# Patient Record
Sex: MALE | Race: WHITE | NOT HISPANIC OR LATINO | Employment: FULL TIME | URBAN - METROPOLITAN AREA
[De-identification: names, ages, dates, MRNs, and addresses within clinical notes are randomized per-mention and may not be internally consistent; named-entity substitution may affect disease eponyms.]

---

## 2017-09-18 ENCOUNTER — OFFICE VISIT (OUTPATIENT)
Dept: FAMILY MEDICINE CLINIC | Facility: CLINIC | Age: 47
End: 2017-09-18

## 2017-09-18 VITALS
BODY MASS INDEX: 25.28 KG/M2 | SYSTOLIC BLOOD PRESSURE: 116 MMHG | TEMPERATURE: 98.4 F | RESPIRATION RATE: 14 BRPM | OXYGEN SATURATION: 98 % | HEIGHT: 70 IN | DIASTOLIC BLOOD PRESSURE: 84 MMHG | WEIGHT: 176.6 LBS | HEART RATE: 56 BPM

## 2017-09-18 DIAGNOSIS — Z12.11 COLON CANCER SCREENING: ICD-10-CM

## 2017-09-18 DIAGNOSIS — Z00.00 ROUTINE GENERAL MEDICAL EXAMINATION AT A HEALTH CARE FACILITY: Primary | ICD-10-CM

## 2017-09-18 DIAGNOSIS — Z12.5 SPECIAL SCREENING FOR MALIGNANT NEOPLASM OF PROSTATE: ICD-10-CM

## 2017-09-18 PROCEDURE — 99386 PREV VISIT NEW AGE 40-64: CPT | Performed by: PHYSICIAN ASSISTANT

## 2017-09-18 RX ORDER — SIMVASTATIN 20 MG
20 TABLET ORAL NIGHTLY
COMMUNITY
End: 2017-11-27 | Stop reason: DRUGHIGH

## 2017-09-18 RX ORDER — FINASTERIDE 5 MG/1
5 TABLET, FILM COATED ORAL DAILY
COMMUNITY
End: 2018-07-03 | Stop reason: SDUPTHER

## 2017-09-18 NOTE — PROGRESS NOTES
Subjective   Branden Aguayo is a 47 y.o. male    History of Present Illness  Patient is new patient 47-year-old white male comes in for preventive medical examination denies any problems complaints does need to be set up for screening colonoscopy      The following portions of the patient's history were reviewed and updated as appropriate: allergies, current medications, past social history and problem list    Review of Systems   Constitutional: Negative.    HENT: Negative.    Eyes: Negative.    Respiratory: Negative.    Cardiovascular: Negative.    Gastrointestinal: Negative.    Endocrine: Negative.    Genitourinary: Negative.    Musculoskeletal: Negative.    Skin: Negative.    Allergic/Immunologic: Negative.    Neurological: Negative.    Hematological: Negative.    Psychiatric/Behavioral: Negative.    All other systems reviewed and are negative.      Objective     Vitals:    09/18/17 0943   BP: 116/84   Pulse: 56   Resp: 14   Temp: 98.4 °F (36.9 °C)   SpO2: 98%       Physical Exam   Constitutional: He is oriented to person, place, and time. He appears well-developed and well-nourished.   HENT:   Head: Normocephalic and atraumatic.   Right Ear: External ear normal.   Left Ear: External ear normal.   Nose: Nose normal.   Mouth/Throat: Oropharynx is clear and moist.   Eyes: Conjunctivae and EOM are normal. Pupils are equal, round, and reactive to light.   Neck: Normal range of motion. Neck supple. No thyromegaly present.   Cardiovascular: Normal rate, regular rhythm, normal heart sounds and intact distal pulses.    No murmur heard.  Pulmonary/Chest: Effort normal and breath sounds normal.   Abdominal: Soft. Bowel sounds are normal. He exhibits no mass. There is no tenderness.   Genitourinary: Rectum normal, prostate normal and penis normal.   Musculoskeletal: Normal range of motion. He exhibits no edema.   Neurological: He is alert and oriented to person, place, and time. He has normal reflexes. No cranial nerve  deficit.   Skin: Skin is warm and dry.   Psychiatric: He has a normal mood and affect.       Assessment/Plan     Diagnoses and all orders for this visit:    Routine general medical examination at a health care facility  -     Ambulatory Referral For Screening Colonoscopy  -     Lipid Panel  -     TSH  -     Comprehensive Metabolic Panel  -     CBC & Differential    Special screening for malignant neoplasm of prostate  -     PSA    Colon cancer screening  -     Ambulatory Referral For Screening Colonoscopy    Other orders  -     simvastatin (ZOCOR) 20 MG tablet; Take 20 mg by mouth Every Night.  -     finasteride (PROSCAR) 5 MG tablet; Take 5 mg by mouth Daily. Take 1/4 tablet daily (1.25mg)    Preventive medicine discussed, diet, exercise healthy living discussed  discussed ways improve health such as low calorie low-carb diet, discussed exercise 3-5 times week for 30 minutes

## 2017-10-05 ENCOUNTER — APPOINTMENT (OUTPATIENT)
Dept: LAB | Facility: HOSPITAL | Age: 47
End: 2017-10-05

## 2017-10-05 LAB
ALBUMIN SERPL-MCNC: 4.4 G/DL (ref 3.2–4.8)
ALBUMIN/GLOB SERPL: 1.9 G/DL (ref 1.5–2.5)
ALP SERPL-CCNC: 80 U/L (ref 25–100)
ALT SERPL W P-5'-P-CCNC: 24 U/L (ref 7–40)
ANION GAP SERPL CALCULATED.3IONS-SCNC: 8 MMOL/L (ref 3–11)
ARTICHOKE IGE QN: 121 MG/DL (ref 0–130)
AST SERPL-CCNC: 23 U/L (ref 0–33)
BASOPHILS # BLD AUTO: 0.01 10*3/MM3 (ref 0–0.2)
BASOPHILS NFR BLD AUTO: 0.2 % (ref 0–1)
BILIRUB SERPL-MCNC: 0.4 MG/DL (ref 0.3–1.2)
BUN BLD-MCNC: 26 MG/DL (ref 9–23)
BUN/CREAT SERPL: 23.6 (ref 7–25)
CALCIUM SPEC-SCNC: 9.4 MG/DL (ref 8.7–10.4)
CHLORIDE SERPL-SCNC: 104 MMOL/L (ref 99–109)
CHOLEST SERPL-MCNC: 237 MG/DL (ref 0–200)
CO2 SERPL-SCNC: 29 MMOL/L (ref 20–31)
CREAT BLD-MCNC: 1.1 MG/DL (ref 0.6–1.3)
DEPRECATED RDW RBC AUTO: 41.9 FL (ref 37–54)
EOSINOPHIL # BLD AUTO: 0.11 10*3/MM3 (ref 0–0.3)
EOSINOPHIL NFR BLD AUTO: 2.2 % (ref 0–3)
ERYTHROCYTE [DISTWIDTH] IN BLOOD BY AUTOMATED COUNT: 12.5 % (ref 11.3–14.5)
GFR SERPL CREATININE-BSD FRML MDRD: 71 ML/MIN/1.73
GLOBULIN UR ELPH-MCNC: 2.3 GM/DL
GLUCOSE BLD-MCNC: 86 MG/DL (ref 70–100)
HCT VFR BLD AUTO: 44.6 % (ref 38.9–50.9)
HDLC SERPL-MCNC: 73 MG/DL (ref 40–60)
HGB BLD-MCNC: 15.1 G/DL (ref 13.1–17.5)
IMM GRANULOCYTES # BLD: 0.01 10*3/MM3 (ref 0–0.03)
IMM GRANULOCYTES NFR BLD: 0.2 % (ref 0–0.6)
LYMPHOCYTES # BLD AUTO: 2.1 10*3/MM3 (ref 0.6–4.8)
LYMPHOCYTES NFR BLD AUTO: 41.8 % (ref 24–44)
MCH RBC QN AUTO: 30.9 PG (ref 27–31)
MCHC RBC AUTO-ENTMCNC: 33.9 G/DL (ref 32–36)
MCV RBC AUTO: 91.2 FL (ref 80–99)
MONOCYTES # BLD AUTO: 0.54 10*3/MM3 (ref 0–1)
MONOCYTES NFR BLD AUTO: 10.8 % (ref 0–12)
NEUTROPHILS # BLD AUTO: 2.25 10*3/MM3 (ref 1.5–8.3)
NEUTROPHILS NFR BLD AUTO: 44.8 % (ref 41–71)
PLATELET # BLD AUTO: 190 10*3/MM3 (ref 150–450)
PMV BLD AUTO: 10.8 FL (ref 6–12)
POTASSIUM BLD-SCNC: 4.8 MMOL/L (ref 3.5–5.5)
PROT SERPL-MCNC: 6.7 G/DL (ref 5.7–8.2)
PSA SERPL-MCNC: 0.43 NG/ML (ref 0–4)
RBC # BLD AUTO: 4.89 10*6/MM3 (ref 4.2–5.76)
SODIUM BLD-SCNC: 141 MMOL/L (ref 132–146)
TRIGL SERPL-MCNC: 145 MG/DL (ref 0–150)
TSH SERPL DL<=0.05 MIU/L-ACNC: 4.55 MIU/ML (ref 0.35–5.35)
WBC NRBC COR # BLD: 5.02 10*3/MM3 (ref 3.5–10.8)

## 2017-10-05 PROCEDURE — 84443 ASSAY THYROID STIM HORMONE: CPT | Performed by: PHYSICIAN ASSISTANT

## 2017-10-05 PROCEDURE — 36415 COLL VENOUS BLD VENIPUNCTURE: CPT | Performed by: PHYSICIAN ASSISTANT

## 2017-10-05 PROCEDURE — 80061 LIPID PANEL: CPT | Performed by: PHYSICIAN ASSISTANT

## 2017-10-05 PROCEDURE — 80053 COMPREHEN METABOLIC PANEL: CPT | Performed by: PHYSICIAN ASSISTANT

## 2017-10-05 PROCEDURE — 84153 ASSAY OF PSA TOTAL: CPT | Performed by: PHYSICIAN ASSISTANT

## 2017-10-05 PROCEDURE — 85025 COMPLETE CBC W/AUTO DIFF WBC: CPT | Performed by: PHYSICIAN ASSISTANT

## 2017-11-27 ENCOUNTER — TELEPHONE (OUTPATIENT)
Dept: FAMILY MEDICINE CLINIC | Facility: CLINIC | Age: 47
End: 2017-11-27

## 2017-11-27 RX ORDER — SIMVASTATIN 40 MG
40 TABLET ORAL NIGHTLY
Qty: 90 TABLET | Refills: 3 | Status: SHIPPED | OUTPATIENT
Start: 2017-11-27 | End: 2018-11-15 | Stop reason: HOSPADM

## 2017-11-27 NOTE — TELEPHONE ENCOUNTER
Clarified with patient that he takes 40mg vs 20mg and sent in 90-day supply per patient's request.

## 2017-11-27 NOTE — TELEPHONE ENCOUNTER
----- Message from Almaz Connelly sent at 11/27/2017 10:09 AM EST -----  Patient needs a refill on his Simvastatin 40 mg sent to [facility] at [facility]..  ThanksAlmaz..

## 2018-07-03 RX ORDER — FINASTERIDE 5 MG/1
5 TABLET, FILM COATED ORAL DAILY
Qty: 30 TABLET | Refills: 5 | Status: SHIPPED | OUTPATIENT
Start: 2018-07-03 | End: 2019-11-05 | Stop reason: SDUPTHER

## 2018-11-14 ENCOUNTER — HOSPITAL ENCOUNTER (OUTPATIENT)
Facility: HOSPITAL | Age: 48
Setting detail: OBSERVATION
Discharge: HOME | End: 2018-11-15
Attending: EMERGENCY MEDICINE | Admitting: INTERNAL MEDICINE

## 2018-11-14 ENCOUNTER — APPOINTMENT (OUTPATIENT)
Dept: GENERAL RADIOLOGY | Facility: HOSPITAL | Age: 48
End: 2018-11-14

## 2018-11-14 ENCOUNTER — APPOINTMENT (OUTPATIENT)
Dept: CARDIOLOGY | Facility: HOSPITAL | Age: 48
End: 2018-11-14

## 2018-11-14 ENCOUNTER — APPOINTMENT (OUTPATIENT)
Dept: MRI IMAGING | Facility: HOSPITAL | Age: 48
End: 2018-11-14
Attending: EMERGENCY MEDICINE

## 2018-11-14 DIAGNOSIS — I21.4 NSTEMI (NON-ST ELEVATED MYOCARDIAL INFARCTION) (HCC): Primary | ICD-10-CM

## 2018-11-14 LAB
ALBUMIN SERPL-MCNC: 4.3 G/DL (ref 3.2–4.8)
ALBUMIN/GLOB SERPL: 2.7 G/DL (ref 1.5–2.5)
ALP SERPL-CCNC: 66 U/L (ref 25–100)
ALT SERPL W P-5'-P-CCNC: 26 U/L (ref 7–40)
ANION GAP SERPL CALCULATED.3IONS-SCNC: 5 MMOL/L (ref 3–11)
APTT PPP: 39 SECONDS (ref 55–70)
APTT PPP: <24 SECONDS (ref 24–31)
AST SERPL-CCNC: 26 U/L (ref 0–33)
BASOPHILS # BLD AUTO: 0.01 10*3/MM3 (ref 0–0.2)
BASOPHILS NFR BLD AUTO: 0.2 % (ref 0–1)
BH CV ECHO MEAS - AO MAX PG (FULL): 5.3 MMHG
BH CV ECHO MEAS - AO MAX PG: 12 MMHG
BH CV ECHO MEAS - AO ROOT AREA (BSA CORRECTED): 1.3
BH CV ECHO MEAS - AO ROOT AREA: 5.4 CM^2
BH CV ECHO MEAS - AO ROOT DIAM: 2.6 CM
BH CV ECHO MEAS - AO V2 MAX: 172.1 CM/SEC
BH CV ECHO MEAS - AVA(V,A): 2.1 CM^2
BH CV ECHO MEAS - AVA(V,D): 2.1 CM^2
BH CV ECHO MEAS - BSA(HAYCOCK): 2 M^2
BH CV ECHO MEAS - BSA(HAYCOCK): 2 M^2
BH CV ECHO MEAS - BSA: 1.9 M^2
BH CV ECHO MEAS - BSA: 1.9 M^2
BH CV ECHO MEAS - BZI_BMI: 25.7 KILOGRAMS/M^2
BH CV ECHO MEAS - BZI_BMI: 25.7 KILOGRAMS/M^2
BH CV ECHO MEAS - BZI_METRIC_HEIGHT: 175.3 CM
BH CV ECHO MEAS - BZI_METRIC_HEIGHT: 175.3 CM
BH CV ECHO MEAS - BZI_METRIC_WEIGHT: 78.9 KG
BH CV ECHO MEAS - BZI_METRIC_WEIGHT: 78.9 KG
BH CV ECHO MEAS - EDV(CUBED): 78.1 ML
BH CV ECHO MEAS - EDV(MOD-SP2): 90 ML
BH CV ECHO MEAS - EDV(MOD-SP4): 93 ML
BH CV ECHO MEAS - EDV(TEICH): 81.9 ML
BH CV ECHO MEAS - EF(CUBED): 65.6 %
BH CV ECHO MEAS - EF(MOD-BP): 65 %
BH CV ECHO MEAS - EF(MOD-SP2): 66.7 %
BH CV ECHO MEAS - EF(MOD-SP4): 64.5 %
BH CV ECHO MEAS - EF(TEICH): 57.5 %
BH CV ECHO MEAS - ESV(CUBED): 26.9 ML
BH CV ECHO MEAS - ESV(MOD-SP2): 30 ML
BH CV ECHO MEAS - ESV(MOD-SP4): 33 ML
BH CV ECHO MEAS - ESV(TEICH): 34.9 ML
BH CV ECHO MEAS - FS: 29.9 %
BH CV ECHO MEAS - IVS/LVPW: 0.76
BH CV ECHO MEAS - IVSD: 0.89 CM
BH CV ECHO MEAS - LA DIMENSION: 3.4 CM
BH CV ECHO MEAS - LA/AO: 1.3
BH CV ECHO MEAS - LAD MAJOR: 5.3 CM
BH CV ECHO MEAS - LAT PEAK E' VEL: 13.6 CM/SEC
BH CV ECHO MEAS - LATERAL E/E' RATIO: 5.8
BH CV ECHO MEAS - LV DIASTOLIC VOL/BSA (35-75): 47.8 ML/M^2
BH CV ECHO MEAS - LV MASS(C)D: 146.7 GRAMS
BH CV ECHO MEAS - LV MASS(C)DI: 75.4 GRAMS/M^2
BH CV ECHO MEAS - LV MAX PG: 6.7 MMHG
BH CV ECHO MEAS - LV MEAN PG: 3.6 MMHG
BH CV ECHO MEAS - LV SYSTOLIC VOL/BSA (12-30): 16.9 ML/M^2
BH CV ECHO MEAS - LV V1 MAX: 129.4 CM/SEC
BH CV ECHO MEAS - LV V1 MEAN: 89.2 CM/SEC
BH CV ECHO MEAS - LV V1 VTI: 25.8 CM
BH CV ECHO MEAS - LVIDD: 4.3 CM
BH CV ECHO MEAS - LVIDS: 3 CM
BH CV ECHO MEAS - LVLD AP2: 7.5 CM
BH CV ECHO MEAS - LVLD AP4: 8.1 CM
BH CV ECHO MEAS - LVLS AP2: 6.4 CM
BH CV ECHO MEAS - LVLS AP4: 6.9 CM
BH CV ECHO MEAS - LVOT AREA (M): 2.8 CM^2
BH CV ECHO MEAS - LVOT AREA: 2.8 CM^2
BH CV ECHO MEAS - LVOT DIAM: 1.9 CM
BH CV ECHO MEAS - LVPWD: 1.2 CM
BH CV ECHO MEAS - MED PEAK E' VEL: 7.7 CM/SEC
BH CV ECHO MEAS - MEDIAL E/E' RATIO: 10.2
BH CV ECHO MEAS - MV A MAX VEL: 58.2 CM/SEC
BH CV ECHO MEAS - MV DEC TIME: 0.27 SEC
BH CV ECHO MEAS - MV E MAX VEL: 79.8 CM/SEC
BH CV ECHO MEAS - MV E/A: 1.4
BH CV ECHO MEAS - PA ACC SLOPE: 725.9 CM/SEC^2
BH CV ECHO MEAS - PA ACC TIME: 0.13 SEC
BH CV ECHO MEAS - PA MAX PG: 7.4 MMHG
BH CV ECHO MEAS - PA PR(ACCEL): 19.6 MMHG
BH CV ECHO MEAS - PA V2 MAX: 135.7 CM/SEC
BH CV ECHO MEAS - SI(CUBED): 26.3 ML/M^2
BH CV ECHO MEAS - SI(LVOT): 36.9 ML/M^2
BH CV ECHO MEAS - SI(MOD-SP2): 30.8 ML/M^2
BH CV ECHO MEAS - SI(MOD-SP4): 30.8 ML/M^2
BH CV ECHO MEAS - SI(TEICH): 24.2 ML/M^2
BH CV ECHO MEAS - SV(CUBED): 51.3 ML
BH CV ECHO MEAS - SV(LVOT): 71.8 ML
BH CV ECHO MEAS - SV(MOD-SP2): 60 ML
BH CV ECHO MEAS - SV(MOD-SP4): 60 ML
BH CV ECHO MEAS - SV(TEICH): 47.1 ML
BH CV ECHO MEAS - TAPSE (>1.6): 2.4 CM2
BH CV ECHO MEAS - TR MAX PG: 25 MMHG
BH CV ECHO MEAS - TR MAX VEL: 247.7 CM/SEC
BH CV ECHO MEASUREMENTS AVERAGE E/E' RATIO: 7.49
BH CV XLRA - RV BASE: 3.6 CM
BH CV XLRA - RV LENGTH: 7.4 CM
BH CV XLRA - RV MID: 3.1 CM
BH CV XLRA - TDI S': 14.7 CM/SEC
BH CV XLRA MEAS LEFT CCA RATIO VEL: 144 CM/SEC
BH CV XLRA MEAS LEFT DIST CCA EDV: 23 CM/SEC
BH CV XLRA MEAS LEFT DIST CCA PSV: 109.6 CM/SEC
BH CV XLRA MEAS LEFT DIST ICA EDV: 29 CM/SEC
BH CV XLRA MEAS LEFT DIST ICA PSV: 70.7 CM/SEC
BH CV XLRA MEAS LEFT ICA RATIO VEL: 117 CM/SEC
BH CV XLRA MEAS LEFT ICA/CCA RATIO: 0.81
BH CV XLRA MEAS LEFT MID CCA EDV: 28.5 CM/SEC
BH CV XLRA MEAS LEFT MID CCA PSV: 145.4 CM/SEC
BH CV XLRA MEAS LEFT MID ICA EDV: 26 CM/SEC
BH CV XLRA MEAS LEFT MID ICA PSV: 94.3 CM/SEC
BH CV XLRA MEAS LEFT PROX CCA EDV: 23.6 CM/SEC
BH CV XLRA MEAS LEFT PROX CCA PSV: 160.1 CM/SEC
BH CV XLRA MEAS LEFT PROX ECA PSV: 135.9 CM/SEC
BH CV XLRA MEAS LEFT PROX ICA EDV: 27.9 CM/SEC
BH CV XLRA MEAS LEFT PROX ICA PSV: 118 CM/SEC
BH CV XLRA MEAS LEFT PROX SCLA PSV: 186.6 CM/SEC
BH CV XLRA MEAS LEFT VERTEBRAL A EDV: 19.6 CM/SEC
BH CV XLRA MEAS LEFT VERTEBRAL A PSV: 73.2 CM/SEC
BH CV XLRA MEAS RIGHT CCA RATIO VEL: 142 CM/SEC
BH CV XLRA MEAS RIGHT DIST CCA EDV: 30.7 CM/SEC
BH CV XLRA MEAS RIGHT DIST CCA PSV: 120.1 CM/SEC
BH CV XLRA MEAS RIGHT DIST ICA EDV: 46.8 CM/SEC
BH CV XLRA MEAS RIGHT DIST ICA PSV: 141.1 CM/SEC
BH CV XLRA MEAS RIGHT ICA RATIO VEL: 133 CM/SEC
BH CV XLRA MEAS RIGHT ICA/CCA RATIO: 0.94
BH CV XLRA MEAS RIGHT MID CCA EDV: 21.6 CM/SEC
BH CV XLRA MEAS RIGHT MID CCA PSV: 143.2 CM/SEC
BH CV XLRA MEAS RIGHT MID ICA EDV: 32.8 CM/SEC
BH CV XLRA MEAS RIGHT MID ICA PSV: 90.1 CM/SEC
BH CV XLRA MEAS RIGHT PROX CCA EDV: 26.9 CM/SEC
BH CV XLRA MEAS RIGHT PROX CCA PSV: 154.9 CM/SEC
BH CV XLRA MEAS RIGHT PROX ECA PSV: 120.1 CM/SEC
BH CV XLRA MEAS RIGHT PROX ICA EDV: 30 CM/SEC
BH CV XLRA MEAS RIGHT PROX ICA PSV: 133.4 CM/SEC
BH CV XLRA MEAS RIGHT PROX SCLA PSV: 148.2 CM/SEC
BH CV XLRA MEAS RIGHT VERTEBRAL A EDV: 28.6 CM/SEC
BH CV XLRA MEAS RIGHT VERTEBRAL A PSV: 141.8 CM/SEC
BILIRUB SERPL-MCNC: 0.4 MG/DL (ref 0.3–1.2)
BILIRUB UR QL STRIP: NEGATIVE
BUN BLD-MCNC: 25 MG/DL (ref 9–23)
BUN/CREAT SERPL: 24.5 (ref 7–25)
CALCIUM SPEC-SCNC: 9 MG/DL (ref 8.7–10.4)
CHLORIDE SERPL-SCNC: 106 MMOL/L (ref 99–109)
CLARITY UR: CLEAR
CO2 SERPL-SCNC: 28 MMOL/L (ref 20–31)
COLOR UR: YELLOW
CREAT BLD-MCNC: 1.02 MG/DL (ref 0.6–1.3)
DEPRECATED RDW RBC AUTO: 42.9 FL (ref 37–54)
EOSINOPHIL # BLD AUTO: 0.12 10*3/MM3 (ref 0–0.3)
EOSINOPHIL NFR BLD AUTO: 2.7 % (ref 0–3)
ERYTHROCYTE [DISTWIDTH] IN BLOOD BY AUTOMATED COUNT: 12.7 % (ref 11.3–14.5)
GFR SERPL CREATININE-BSD FRML MDRD: 78 ML/MIN/1.73
GLOBULIN UR ELPH-MCNC: 1.6 GM/DL
GLUCOSE BLD-MCNC: 111 MG/DL (ref 70–100)
GLUCOSE UR STRIP-MCNC: NEGATIVE MG/DL
HCT VFR BLD AUTO: 45.3 % (ref 38.9–50.9)
HGB BLD-MCNC: 15.1 G/DL (ref 13.1–17.5)
HGB UR QL STRIP.AUTO: NEGATIVE
IMM GRANULOCYTES # BLD: 0.02 10*3/MM3 (ref 0–0.03)
IMM GRANULOCYTES NFR BLD: 0.4 % (ref 0–0.6)
INR PPP: 0.96 (ref 0.91–1.09)
KETONES UR QL STRIP: NEGATIVE
LEFT ARM BP: NORMAL MMHG
LEFT ATRIUM VOLUME INDEX: 20.5 ML/M^2
LEUKOCYTE ESTERASE UR QL STRIP.AUTO: NEGATIVE
LV EF 2D ECHO EST: 65 %
LYMPHOCYTES # BLD AUTO: 1.83 10*3/MM3 (ref 0.6–4.8)
LYMPHOCYTES NFR BLD AUTO: 41 % (ref 24–44)
MAGNESIUM SERPL-MCNC: 2 MG/DL (ref 1.3–2.7)
MAXIMAL PREDICTED HEART RATE: 171 BPM
MCH RBC QN AUTO: 30.7 PG (ref 27–31)
MCHC RBC AUTO-ENTMCNC: 33.3 G/DL (ref 32–36)
MCV RBC AUTO: 92.1 FL (ref 80–99)
MONOCYTES # BLD AUTO: 0.44 10*3/MM3 (ref 0–1)
MONOCYTES NFR BLD AUTO: 9.9 % (ref 0–12)
NEUTROPHILS # BLD AUTO: 2.04 10*3/MM3 (ref 1.5–8.3)
NEUTROPHILS NFR BLD AUTO: 45.8 % (ref 41–71)
NITRITE UR QL STRIP: NEGATIVE
PH UR STRIP.AUTO: 6.5 [PH] (ref 5–8)
PLATELET # BLD AUTO: 162 10*3/MM3 (ref 150–450)
PMV BLD AUTO: 11 FL (ref 6–12)
POTASSIUM BLD-SCNC: 4.1 MMOL/L (ref 3.5–5.5)
PROT SERPL-MCNC: 5.9 G/DL (ref 5.7–8.2)
PROT UR QL STRIP: NEGATIVE
PROTHROMBIN TIME: 10.1 SECONDS (ref 9.6–11.5)
RBC # BLD AUTO: 4.92 10*6/MM3 (ref 4.2–5.76)
SODIUM BLD-SCNC: 139 MMOL/L (ref 132–146)
SP GR UR STRIP: 1.02 (ref 1–1.03)
STRESS TARGET HR: 145 BPM
TROPONIN I SERPL-MCNC: 0.01 NG/ML (ref 0–0.07)
TROPONIN I SERPL-MCNC: 0.06 NG/ML
TROPONIN I SERPL-MCNC: 0.28 NG/ML
TROPONIN I SERPL-MCNC: 0.8 NG/ML
TROPONIN I SERPL-MCNC: 1.43 NG/ML
UROBILINOGEN UR QL STRIP: NORMAL
WBC NRBC COR # BLD: 4.46 10*3/MM3 (ref 3.5–10.8)

## 2018-11-14 PROCEDURE — 84484 ASSAY OF TROPONIN QUANT: CPT

## 2018-11-14 PROCEDURE — 80053 COMPREHEN METABOLIC PANEL: CPT | Performed by: EMERGENCY MEDICINE

## 2018-11-14 PROCEDURE — 84484 ASSAY OF TROPONIN QUANT: CPT | Performed by: PHYSICIAN ASSISTANT

## 2018-11-14 PROCEDURE — G0378 HOSPITAL OBSERVATION PER HR: HCPCS

## 2018-11-14 PROCEDURE — 71046 X-RAY EXAM CHEST 2 VIEWS: CPT

## 2018-11-14 PROCEDURE — 93005 ELECTROCARDIOGRAM TRACING: CPT | Performed by: EMERGENCY MEDICINE

## 2018-11-14 PROCEDURE — 70551 MRI BRAIN STEM W/O DYE: CPT

## 2018-11-14 PROCEDURE — 85025 COMPLETE CBC W/AUTO DIFF WBC: CPT | Performed by: EMERGENCY MEDICINE

## 2018-11-14 PROCEDURE — 96376 TX/PRO/DX INJ SAME DRUG ADON: CPT

## 2018-11-14 PROCEDURE — 81003 URINALYSIS AUTO W/O SCOPE: CPT | Performed by: EMERGENCY MEDICINE

## 2018-11-14 PROCEDURE — 85730 THROMBOPLASTIN TIME PARTIAL: CPT

## 2018-11-14 PROCEDURE — 93306 TTE W/DOPPLER COMPLETE: CPT

## 2018-11-14 PROCEDURE — 84484 ASSAY OF TROPONIN QUANT: CPT | Performed by: EMERGENCY MEDICINE

## 2018-11-14 PROCEDURE — 93880 EXTRACRANIAL BILAT STUDY: CPT | Performed by: INTERNAL MEDICINE

## 2018-11-14 PROCEDURE — 93306 TTE W/DOPPLER COMPLETE: CPT | Performed by: INTERNAL MEDICINE

## 2018-11-14 PROCEDURE — 99285 EMERGENCY DEPT VISIT HI MDM: CPT

## 2018-11-14 PROCEDURE — 83735 ASSAY OF MAGNESIUM: CPT | Performed by: EMERGENCY MEDICINE

## 2018-11-14 PROCEDURE — 85730 THROMBOPLASTIN TIME PARTIAL: CPT | Performed by: EMERGENCY MEDICINE

## 2018-11-14 PROCEDURE — 25010000002 HEPARIN (PORCINE) PER 1000 UNITS: Performed by: EMERGENCY MEDICINE

## 2018-11-14 PROCEDURE — 93005 ELECTROCARDIOGRAM TRACING: CPT | Performed by: PHYSICIAN ASSISTANT

## 2018-11-14 PROCEDURE — 93880 EXTRACRANIAL BILAT STUDY: CPT

## 2018-11-14 PROCEDURE — 93010 ELECTROCARDIOGRAM REPORT: CPT | Performed by: INTERNAL MEDICINE

## 2018-11-14 PROCEDURE — 99222 1ST HOSP IP/OBS MODERATE 55: CPT | Performed by: INTERNAL MEDICINE

## 2018-11-14 PROCEDURE — 96365 THER/PROPH/DIAG IV INF INIT: CPT

## 2018-11-14 PROCEDURE — 85610 PROTHROMBIN TIME: CPT | Performed by: EMERGENCY MEDICINE

## 2018-11-14 PROCEDURE — 96366 THER/PROPH/DIAG IV INF ADDON: CPT

## 2018-11-14 PROCEDURE — 25010000002 HEPARIN (PORCINE) PER 1000 UNITS

## 2018-11-14 RX ORDER — FINASTERIDE 5 MG/1
5 TABLET, FILM COATED ORAL DAILY
Status: DISCONTINUED | OUTPATIENT
Start: 2018-11-15 | End: 2018-11-15

## 2018-11-14 RX ORDER — ATORVASTATIN CALCIUM 40 MG/1
80 TABLET, FILM COATED ORAL NIGHTLY
Status: DISCONTINUED | OUTPATIENT
Start: 2018-11-14 | End: 2018-11-14 | Stop reason: SDUPTHER

## 2018-11-14 RX ORDER — HEPARIN SODIUM 1000 [USP'U]/ML
4000 INJECTION, SOLUTION INTRAVENOUS; SUBCUTANEOUS ONCE
Status: COMPLETED | OUTPATIENT
Start: 2018-11-14 | End: 2018-11-14

## 2018-11-14 RX ORDER — ASPIRIN 325 MG
325 TABLET ORAL ONCE
Status: DISCONTINUED | OUTPATIENT
Start: 2018-11-14 | End: 2018-11-15 | Stop reason: HOSPADM

## 2018-11-14 RX ORDER — HEPARIN SODIUM 1000 [USP'U]/ML
4000 INJECTION, SOLUTION INTRAVENOUS; SUBCUTANEOUS ONCE
Status: DISCONTINUED | OUTPATIENT
Start: 2018-11-14 | End: 2018-11-14

## 2018-11-14 RX ORDER — SODIUM CHLORIDE 0.9 % (FLUSH) 0.9 %
10 SYRINGE (ML) INJECTION AS NEEDED
Status: DISCONTINUED | OUTPATIENT
Start: 2018-11-14 | End: 2018-11-15 | Stop reason: HOSPADM

## 2018-11-14 RX ORDER — CLOPIDOGREL BISULFATE 75 MG/1
600 TABLET ORAL ONCE
Status: COMPLETED | OUTPATIENT
Start: 2018-11-14 | End: 2018-11-14

## 2018-11-14 RX ORDER — ASPIRIN 325 MG
325 TABLET ORAL DAILY
Status: DISCONTINUED | OUTPATIENT
Start: 2018-11-15 | End: 2018-11-15 | Stop reason: HOSPADM

## 2018-11-14 RX ORDER — SODIUM CHLORIDE 0.9 % (FLUSH) 0.9 %
3-10 SYRINGE (ML) INJECTION AS NEEDED
Status: DISCONTINUED | OUTPATIENT
Start: 2018-11-14 | End: 2018-11-15 | Stop reason: HOSPADM

## 2018-11-14 RX ORDER — HEPARIN SODIUM 1000 [USP'U]/ML
30 INJECTION, SOLUTION INTRAVENOUS; SUBCUTANEOUS AS NEEDED
Status: DISCONTINUED | OUTPATIENT
Start: 2018-11-14 | End: 2018-11-14

## 2018-11-14 RX ORDER — ONDANSETRON 4 MG/1
4 TABLET, FILM COATED ORAL EVERY 6 HOURS PRN
Status: DISCONTINUED | OUTPATIENT
Start: 2018-11-14 | End: 2018-11-15 | Stop reason: HOSPADM

## 2018-11-14 RX ORDER — CLOPIDOGREL BISULFATE 75 MG/1
75 TABLET ORAL DAILY
Status: DISCONTINUED | OUTPATIENT
Start: 2018-11-15 | End: 2018-11-15 | Stop reason: HOSPADM

## 2018-11-14 RX ORDER — ACETAMINOPHEN 325 MG/1
650 TABLET ORAL EVERY 4 HOURS PRN
Status: DISCONTINUED | OUTPATIENT
Start: 2018-11-14 | End: 2018-11-15 | Stop reason: HOSPADM

## 2018-11-14 RX ORDER — SODIUM CHLORIDE 0.9 % (FLUSH) 0.9 %
3 SYRINGE (ML) INJECTION EVERY 12 HOURS SCHEDULED
Status: DISCONTINUED | OUTPATIENT
Start: 2018-11-14 | End: 2018-11-15 | Stop reason: HOSPADM

## 2018-11-14 RX ORDER — ATORVASTATIN CALCIUM 40 MG/1
80 TABLET, FILM COATED ORAL NIGHTLY
Status: DISCONTINUED | OUTPATIENT
Start: 2018-11-14 | End: 2018-11-15 | Stop reason: HOSPADM

## 2018-11-14 RX ORDER — FAMOTIDINE 20 MG/1
40 TABLET, FILM COATED ORAL DAILY
Status: DISCONTINUED | OUTPATIENT
Start: 2018-11-14 | End: 2018-11-15 | Stop reason: HOSPADM

## 2018-11-14 RX ORDER — HEPARIN SODIUM 1000 [USP'U]/ML
60 INJECTION, SOLUTION INTRAVENOUS; SUBCUTANEOUS AS NEEDED
Status: DISCONTINUED | OUTPATIENT
Start: 2018-11-14 | End: 2018-11-14

## 2018-11-14 RX ADMIN — FAMOTIDINE 40 MG: 20 TABLET ORAL at 16:37

## 2018-11-14 RX ADMIN — HEPARIN SODIUM 4000 UNITS: 1000 INJECTION, SOLUTION INTRAVENOUS; SUBCUTANEOUS at 18:51

## 2018-11-14 RX ADMIN — ATORVASTATIN CALCIUM 80 MG: 40 TABLET, FILM COATED ORAL at 20:52

## 2018-11-14 RX ADMIN — CLOPIDOGREL BISULFATE 600 MG: 75 TABLET ORAL at 16:37

## 2018-11-14 RX ADMIN — HEPARIN SODIUM 12 UNITS/KG/HR: 10000 INJECTION, SOLUTION INTRAVENOUS at 11:46

## 2018-11-14 RX ADMIN — HEPARIN SODIUM 4000 UNITS: 1000 INJECTION, SOLUTION INTRAVENOUS; SUBCUTANEOUS at 11:46

## 2018-11-14 RX ADMIN — Medication 3 ML: at 20:52

## 2018-11-15 ENCOUNTER — APPOINTMENT (OUTPATIENT)
Dept: CT IMAGING | Facility: HOSPITAL | Age: 48
End: 2018-11-15

## 2018-11-15 ENCOUNTER — APPOINTMENT (OUTPATIENT)
Dept: CT IMAGING | Facility: HOSPITAL | Age: 48
End: 2018-11-15
Attending: PSYCHIATRY & NEUROLOGY

## 2018-11-15 VITALS
DIASTOLIC BLOOD PRESSURE: 71 MMHG | BODY MASS INDEX: 25.77 KG/M2 | TEMPERATURE: 98.3 F | SYSTOLIC BLOOD PRESSURE: 120 MMHG | HEIGHT: 69 IN | OXYGEN SATURATION: 96 % | HEART RATE: 61 BPM | WEIGHT: 174 LBS | RESPIRATION RATE: 16 BRPM

## 2018-11-15 LAB
APTT PPP: 105.9 SECONDS (ref 55–70)
TROPONIN I SERPL-MCNC: 0.14 NG/ML

## 2018-11-15 PROCEDURE — 99252 IP/OBS CONSLTJ NEW/EST SF 35: CPT | Performed by: PSYCHIATRY & NEUROLOGY

## 2018-11-15 PROCEDURE — 25010000002 HEPARIN (PORCINE) PER 1000 UNITS: Performed by: INTERNAL MEDICINE

## 2018-11-15 PROCEDURE — 70498 CT ANGIOGRAPHY NECK: CPT

## 2018-11-15 PROCEDURE — 0 IOPAMIDOL PER 1 ML: Performed by: INTERNAL MEDICINE

## 2018-11-15 PROCEDURE — 93458 L HRT ARTERY/VENTRICLE ANGIO: CPT | Performed by: INTERNAL MEDICINE

## 2018-11-15 PROCEDURE — C1894 INTRO/SHEATH, NON-LASER: HCPCS | Performed by: INTERNAL MEDICINE

## 2018-11-15 PROCEDURE — 25010000002 MIDAZOLAM PER 1 MG: Performed by: INTERNAL MEDICINE

## 2018-11-15 PROCEDURE — G0378 HOSPITAL OBSERVATION PER HR: HCPCS

## 2018-11-15 PROCEDURE — 84484 ASSAY OF TROPONIN QUANT: CPT | Performed by: PHYSICIAN ASSISTANT

## 2018-11-15 PROCEDURE — 70496 CT ANGIOGRAPHY HEAD: CPT

## 2018-11-15 PROCEDURE — 85730 THROMBOPLASTIN TIME PARTIAL: CPT

## 2018-11-15 PROCEDURE — 99238 HOSP IP/OBS DSCHRG MGMT 30/<: CPT | Performed by: INTERNAL MEDICINE

## 2018-11-15 PROCEDURE — C1769 GUIDE WIRE: HCPCS | Performed by: INTERNAL MEDICINE

## 2018-11-15 PROCEDURE — 96366 THER/PROPH/DIAG IV INF ADDON: CPT

## 2018-11-15 PROCEDURE — 25010000002 HEPARIN (PORCINE) PER 1000 UNITS

## 2018-11-15 PROCEDURE — 25010000002 FENTANYL CITRATE (PF) 100 MCG/2ML SOLUTION: Performed by: INTERNAL MEDICINE

## 2018-11-15 RX ORDER — FINASTERIDE 5 MG/1
5 TABLET, FILM COATED ORAL NIGHTLY
Status: DISCONTINUED | OUTPATIENT
Start: 2018-11-15 | End: 2018-11-15 | Stop reason: HOSPADM

## 2018-11-15 RX ORDER — MIDAZOLAM HYDROCHLORIDE 1 MG/ML
INJECTION INTRAMUSCULAR; INTRAVENOUS AS NEEDED
Status: DISCONTINUED | OUTPATIENT
Start: 2018-11-15 | End: 2018-11-15 | Stop reason: HOSPADM

## 2018-11-15 RX ORDER — FENTANYL CITRATE 50 UG/ML
INJECTION, SOLUTION INTRAMUSCULAR; INTRAVENOUS AS NEEDED
Status: DISCONTINUED | OUTPATIENT
Start: 2018-11-15 | End: 2018-11-15 | Stop reason: HOSPADM

## 2018-11-15 RX ORDER — LIDOCAINE HYDROCHLORIDE 10 MG/ML
INJECTION, SOLUTION EPIDURAL; INFILTRATION; INTRACAUDAL; PERINEURAL AS NEEDED
Status: DISCONTINUED | OUTPATIENT
Start: 2018-11-15 | End: 2018-11-15 | Stop reason: HOSPADM

## 2018-11-15 RX ADMIN — FAMOTIDINE 40 MG: 20 TABLET ORAL at 08:54

## 2018-11-15 RX ADMIN — HEPARIN SODIUM 13 UNITS/KG/HR: 10000 INJECTION, SOLUTION INTRAVENOUS at 07:10

## 2018-11-15 RX ADMIN — HEPARIN SODIUM 13 UNITS/KG/HR: 10000 INJECTION, SOLUTION INTRAVENOUS at 03:49

## 2018-11-15 RX ADMIN — ASPIRIN 325 MG ORAL TABLET 325 MG: 325 PILL ORAL at 08:55

## 2018-11-15 RX ADMIN — Medication 3 ML: at 08:55

## 2018-11-15 RX ADMIN — CLOPIDOGREL BISULFATE 75 MG: 75 TABLET ORAL at 08:54

## 2018-11-15 RX ADMIN — IOPAMIDOL 75 ML: 755 INJECTION, SOLUTION INTRAVENOUS at 15:45

## 2018-11-16 ENCOUNTER — READMISSION MANAGEMENT (OUTPATIENT)
Dept: CALL CENTER | Facility: HOSPITAL | Age: 48
End: 2018-11-16

## 2018-11-16 ENCOUNTER — TRANSITIONAL CARE MANAGEMENT TELEPHONE ENCOUNTER (OUTPATIENT)
Dept: FAMILY MEDICINE CLINIC | Facility: CLINIC | Age: 48
End: 2018-11-16

## 2018-11-16 NOTE — OUTREACH NOTE
Prep Survey      Responses   Facility patient discharged from?  [facility]   Is patient eligible?  Yes   Discharge diagnosis    NSTEMI (non-ST elevated myocardial infarction) (CMS/HCC   Does the patient have one of the following disease processes/diagnoses(primary or secondary)?  Acute MI (STEMI,NSTEMI)   Does the patient have Home health ordered?  No   Is there a DME ordered?  No   Prep survey completed?  Yes          Pia Chiang RN

## 2018-11-19 ENCOUNTER — READMISSION MANAGEMENT (OUTPATIENT)
Dept: CALL CENTER | Facility: HOSPITAL | Age: 48
End: 2018-11-19

## 2018-11-19 ENCOUNTER — OFFICE VISIT (OUTPATIENT)
Dept: FAMILY MEDICINE CLINIC | Facility: CLINIC | Age: 48
End: 2018-11-19

## 2018-11-19 VITALS
RESPIRATION RATE: 16 BRPM | SYSTOLIC BLOOD PRESSURE: 116 MMHG | OXYGEN SATURATION: 99 % | HEIGHT: 69 IN | WEIGHT: 177.2 LBS | HEART RATE: 52 BPM | DIASTOLIC BLOOD PRESSURE: 72 MMHG | BODY MASS INDEX: 26.25 KG/M2

## 2018-11-19 DIAGNOSIS — I21.4 NSTEMI (NON-ST ELEVATED MYOCARDIAL INFARCTION) (HCC): Primary | ICD-10-CM

## 2018-11-19 PROCEDURE — 99496 TRANSJ CARE MGMT HIGH F2F 7D: CPT | Performed by: PHYSICIAN ASSISTANT

## 2018-11-19 RX ORDER — ATORVASTATIN CALCIUM 80 MG/1
80 TABLET, FILM COATED ORAL
Refills: 0 | COMMUNITY
Start: 2018-11-15 | End: 2019-02-25 | Stop reason: SDUPTHER

## 2018-11-19 NOTE — OUTREACH NOTE
AMI Week 1 Survey      Responses   Facility patient discharged from?  [facility]   Does the patient have one of the following disease processes/diagnoses(primary or secondary)?  Acute MI (STEMI,NSTEMI)   Is there a successful TCM telephone encounter documented?  Yes [TCM completed 11/16/18. ]          Batsheva Stapleton RN

## 2018-11-19 NOTE — PROGRESS NOTES
Subjective    is being seen for follow-up after hospitalization at     The date of hospitalization were .   Date of Admission:  11/14/2018  Date of Discharge:  11/15/2018         Discharge Diagnosis during hospitalization was  #1 positive troponin   2 transient neurologic symptoms  #3  NSTEMI (non-ST elevated myocardial infarction) (CMS/[facility])      Hospital course:    Patient is a 48 y.o. male presented with a 48-year-old gentleman with history of hyperlipidemia and prostate issues who came in after a bout where he had some neurologic symptoms he began to have problems putting his leg and his pants that he felt some unusual upper extremity abnormalities as well.  He took an aspirin and came to the hospital he had an MRI which was negative his EKG was normal and his echo was normal but he had an set of cardiac enzymes which went to 0.7 and 1.4.  He underwent cardiac catheter today which showed minimal luminal irregularities up to 20-30% most notably in the septal  and the ostium of the ramus.  He had normal filling pressures MRI of the brain was negative.  He was in seen in consultation also by Dr. Fletcher Presley who ordered a CT angiogram of the head and neck.  The patient will be discharged home after that procedure with follow-up with family practice today  He will maintain aspirin therapy and were switching him to Lipitor 80 mg daily at bedtime.        Discharged to: To home    Discharged on the following medicines: See below     Discharge Medications           Accurate as of 11/19/18  8:43 AM. If you have any questions, ask your nurse or doctor.               Continue These Medications      Instructions Start Date   aspirin 81 MG tablet   81 mg, Oral, Daily      atorvastatin 80 MG tablet  Commonly known as:  LIPITOR   80 mg, Oral, Every Night at Bedtime      finasteride 5 MG tablet  Commonly known as:  PROSCAR   5 mg, Oral, Daily      MULTIVITAMIN ADULT PO   1 tablet, Oral, Daily                Information from the hospitalization was given the patient    Review of Systems   Constitutional: Negative for appetite change, diaphoresis, fatigue and unexpected weight change.   Eyes: Negative for visual disturbance.   Respiratory: Negative for cough, chest tightness and shortness of breath.    Cardiovascular: Negative for chest pain, palpitations and leg swelling.   Gastrointestinal: Negative for diarrhea, nausea and vomiting.   Endocrine: Negative for polydipsia, polyphagia and polyuria.   Skin: Negative for color change and rash.   Neurological: Negative for dizziness, syncope, weakness, light-headedness, numbness and headaches.       Objective     Vitals:    11/19/18 0834   BP: 116/72   Pulse: 52   Resp: 16   SpO2: 99%       Physical Exam   Constitutional: He appears well-developed and well-nourished.   Neck: Neck supple. No JVD present. No thyromegaly present.   Cardiovascular: Normal rate, regular rhythm, normal heart sounds and intact distal pulses.   Pulmonary/Chest: Effort normal and breath sounds normal.   Abdominal: Soft. Bowel sounds are normal.   Musculoskeletal: He exhibits no edema.   Lymphadenopathy:     He has no cervical adenopathy.   Neurological: No sensory deficit.   Skin: Skin is warm and dry. He is not diaphoretic.   Nursing note and vitals reviewed.      Assessment/Plan     Problem List Items Addressed This Visit        Cardiovascular and Mediastinum    NSTEMI (non-ST elevated myocardial infarction) (CMS/[facility]) - Primary        #1 continue Lipitor 80 mg along with aspirin    Follow-up in 4 weeks for full physical examination    Transitional Care Management Certification  I certify that the following are true:  1. Communication was made within 2 business days of discharge.  2. Complexity of Medical Decision Making is moderate.  3. Face to face visit occurred within  days.    *Note: 82643 is for high complexity patients with a face to face visit within 7 days of discharge.  75035 is  for high complexity patients with a face to face on days 8-14 post discharge or medium complexity with face to face visit within 14 days post discharge.

## 2018-11-26 ENCOUNTER — READMISSION MANAGEMENT (OUTPATIENT)
Dept: CALL CENTER | Facility: HOSPITAL | Age: 48
End: 2018-11-26

## 2018-11-26 NOTE — OUTREACH NOTE
AMI Week 2 Survey      Responses   Facility patient discharged from?  [facility]   Does the patient have one of the following disease processes/diagnoses(primary or secondary)?  Acute MI (STEMI,NSTEMI)   Week 2 attempt successful?  No   Unsuccessful attempts  Attempt 1          Theron Weiss RN

## 2018-11-27 ENCOUNTER — READMISSION MANAGEMENT (OUTPATIENT)
Dept: CALL CENTER | Facility: HOSPITAL | Age: 48
End: 2018-11-27

## 2018-11-27 NOTE — OUTREACH NOTE
AMI Week 2 Survey      Responses   Facility patient discharged from?  [facility]   Does the patient have one of the following disease processes/diagnoses(primary or secondary)?  Acute MI (STEMI,NSTEMI)   Week 2 attempt successful?  Yes   Call start time  1119   Call end time  1123   Discharge diagnosis    NSTEMI (non-ST elevated myocardial infarction) (CMS/[facility]   Meds reviewed with patient/caregiver?  Yes   Is the patient having any side effects they believe may be caused by any medication additions or changes?  No   Does the patient have all prescriptions related to this admission filled (includes statins,anticoagulants,HTN meds,anti-arrhythmia meds)  Yes   Is the patient taking all medications as directed (includes completed medication regime)?  Yes   Does the patient have a primary care provider?   Yes   Does the patient have an appointment with their PCP,cardiologist,or clinic within 7 days of discharge?  Yes   Comments regarding PCP  Has seen PCP Kofi Watson already and has a check up scheduled on 12/18/2018   Has the patient kept scheduled appointments due by today?  Yes   Has home health visited the patient within 72 hours of discharge?  N/A   Psychosocial issues?  No   Did the patient receive a copy of their discharge instructions?  Yes   Nursing interventions  Reviewed instructions with patient   What is the patient's perception of their health status since discharge?  Improving   Nursing interventions  Nurse provided patient education   Is the patient/caregiver able to teach back signs and symptoms of when to call for help immediately:  Sudden chest discomfort, Sudden discomfort in arms, back, neck or jaw, Shortness of breath at any time, Nausea or vomiting, Sudden sweating or clammy skin, Dizziness or lightheadedness, Irregular or rapid heart rate   Nursing interventions  Nurse provided patient education   Is the pateint /caregiver able to teach back the importance of cardiac rehab?  Yes   Nursing  interventions  Provided education on importance of cardiac rehab   Is the patient/caregiver able to teach back lifestyle changes to help prevent MIs  Reducing stress, Regular exercise as approved by provider, Heart healthy diet   Is the patient/caregiver able to teach back ways to prevent a second heart attack:  Take medications, Follow up with MD, Participate in Cardiac Rehab, Manage risk factors   Is the patient/caregiver able to teach back the hierarchy of who to call/visit for symptoms/problems? PCP, Specialist, Home health nurse, Urgent Care, ED, 911  Yes   Week 2 call completed?  Yes          Theron Weiss RN

## 2018-12-04 ENCOUNTER — DOCUMENTATION (OUTPATIENT)
Dept: CARDIAC REHAB | Facility: HOSPITAL | Age: 48
End: 2018-12-04

## 2018-12-04 NOTE — PROGRESS NOTES
=Pt. Referred for Phase II Cardiac Rehab. Staff left detailed message with Patient to return phone call in regards to scheduling an appointment.

## 2018-12-18 ENCOUNTER — LAB (OUTPATIENT)
Dept: LAB | Facility: HOSPITAL | Age: 48
End: 2018-12-18

## 2018-12-18 ENCOUNTER — OFFICE VISIT (OUTPATIENT)
Dept: FAMILY MEDICINE CLINIC | Facility: CLINIC | Age: 48
End: 2018-12-18

## 2018-12-18 VITALS
HEIGHT: 69 IN | BODY MASS INDEX: 26.51 KG/M2 | DIASTOLIC BLOOD PRESSURE: 76 MMHG | HEART RATE: 48 BPM | OXYGEN SATURATION: 99 % | SYSTOLIC BLOOD PRESSURE: 110 MMHG | TEMPERATURE: 98.1 F | RESPIRATION RATE: 14 BRPM | WEIGHT: 179 LBS

## 2018-12-18 DIAGNOSIS — Z00.00 ROUTINE GENERAL MEDICAL EXAMINATION AT A HEALTH CARE FACILITY: ICD-10-CM

## 2018-12-18 DIAGNOSIS — Z00.00 ROUTINE GENERAL MEDICAL EXAMINATION AT A HEALTH CARE FACILITY: Primary | ICD-10-CM

## 2018-12-18 LAB
ALBUMIN SERPL-MCNC: 4.88 G/DL (ref 3.2–4.8)
ALBUMIN/GLOB SERPL: 3 G/DL (ref 1.5–2.5)
ALP SERPL-CCNC: 66 U/L (ref 25–100)
ALT SERPL W P-5'-P-CCNC: 38 U/L (ref 7–40)
ANION GAP SERPL CALCULATED.3IONS-SCNC: 5 MMOL/L (ref 3–11)
ARTICHOKE IGE QN: 88 MG/DL (ref 0–130)
AST SERPL-CCNC: 29 U/L (ref 0–33)
BILIRUB SERPL-MCNC: 0.6 MG/DL (ref 0.3–1.2)
BUN BLD-MCNC: 21 MG/DL (ref 9–23)
BUN/CREAT SERPL: 18.1 (ref 7–25)
CALCIUM SPEC-SCNC: 9.4 MG/DL (ref 8.7–10.4)
CHLORIDE SERPL-SCNC: 102 MMOL/L (ref 99–109)
CHOLEST SERPL-MCNC: 172 MG/DL (ref 0–200)
CO2 SERPL-SCNC: 31 MMOL/L (ref 20–31)
CREAT BLD-MCNC: 1.16 MG/DL (ref 0.6–1.3)
GFR SERPL CREATININE-BSD FRML MDRD: 67 ML/MIN/1.73
GLOBULIN UR ELPH-MCNC: 1.6 GM/DL
GLUCOSE BLD-MCNC: 93 MG/DL (ref 70–100)
HDLC SERPL-MCNC: 67 MG/DL (ref 40–60)
POTASSIUM BLD-SCNC: 4.8 MMOL/L (ref 3.5–5.5)
PROT SERPL-MCNC: 6.5 G/DL (ref 5.7–8.2)
PSA SERPL-MCNC: 0.27 NG/ML (ref 0–4)
SODIUM BLD-SCNC: 138 MMOL/L (ref 132–146)
TRIGL SERPL-MCNC: 78 MG/DL (ref 0–150)
TSH SERPL DL<=0.05 MIU/L-ACNC: 4.13 MIU/ML (ref 0.35–5.35)

## 2018-12-18 PROCEDURE — 99396 PREV VISIT EST AGE 40-64: CPT | Performed by: PHYSICIAN ASSISTANT

## 2018-12-18 PROCEDURE — 36415 COLL VENOUS BLD VENIPUNCTURE: CPT

## 2018-12-18 PROCEDURE — 84443 ASSAY THYROID STIM HORMONE: CPT

## 2018-12-18 PROCEDURE — 80053 COMPREHEN METABOLIC PANEL: CPT

## 2018-12-18 PROCEDURE — G0103 PSA SCREENING: HCPCS

## 2018-12-18 PROCEDURE — 80061 LIPID PANEL: CPT

## 2018-12-18 RX ORDER — CLINDAMYCIN HYDROCHLORIDE 300 MG/1
CAPSULE ORAL
COMMUNITY
Start: 2018-12-13 | End: 2019-11-22

## 2018-12-18 NOTE — PROGRESS NOTES
Subjective   Branden Aguayo is a 48 y.o. male  Annual Exam (Pt is fasting. UTD on vaccines. )      History of Present Illness  Patient 48-year-old white male comes in for preventive medical examination, no problems or complaints  The following portions of the patient's history were reviewed and updated as appropriate: allergies, current medications, past social history and problem list    Review of Systems   Constitutional: Negative for appetite change, diaphoresis, fatigue and unexpected weight change.   Eyes: Negative for visual disturbance.   Respiratory: Negative for cough, chest tightness and shortness of breath.    Cardiovascular: Negative for chest pain, palpitations and leg swelling.   Gastrointestinal: Negative for diarrhea, nausea and vomiting.   Endocrine: Negative for polydipsia, polyphagia and polyuria.   Skin: Negative for color change and rash.   Neurological: Negative for dizziness, syncope, weakness, light-headedness, numbness and headaches.       Objective     Vitals:    12/18/18 0923   BP: 110/76   Pulse: (!) 48   Resp: 14   Temp: 98.1 °F (36.7 °C)   SpO2: 99%       Physical Exam   Constitutional: He is oriented to person, place, and time. He appears well-developed and well-nourished.   HENT:   Head: Normocephalic and atraumatic.   Right Ear: External ear normal.   Left Ear: External ear normal.   Nose: Nose normal.   Mouth/Throat: Oropharynx is clear and moist.   Eyes: Conjunctivae and EOM are normal. Pupils are equal, round, and reactive to light.   Neck: Normal range of motion. Neck supple. No thyromegaly present.   Cardiovascular: Normal rate, regular rhythm, normal heart sounds and intact distal pulses.   No murmur heard.  Pulmonary/Chest: Effort normal and breath sounds normal.   Abdominal: Soft. Bowel sounds are normal. He exhibits no mass. There is no tenderness.   Genitourinary: Rectum normal, prostate normal and penis normal.   Musculoskeletal: Normal range of motion. He exhibits no  edema.   Neurological: He is alert and oriented to person, place, and time. He has normal reflexes. No cranial nerve deficit.   Skin: Skin is warm and dry.   Psychiatric: He has a normal mood and affect.       Assessment/Plan     Diagnoses and all orders for this visit:    Routine general medical examination at a health care facility  -     POCT urinalysis dipstick, automated  -     Comprehensive Metabolic Panel; Future  -     Lipid Panel; Future  -     PSA Screen; Future  -     TSH; Future    Other orders  -     clindamycin (CLEOCIN) 300 MG capsule;      preventive medicine discussed, diet and exercise healthy living techniques, exercise 3-5 times per week for 30 minutes

## 2019-02-25 ENCOUNTER — TELEPHONE (OUTPATIENT)
Dept: FAMILY MEDICINE CLINIC | Facility: CLINIC | Age: 49
End: 2019-02-25

## 2019-02-25 RX ORDER — ATORVASTATIN CALCIUM 80 MG/1
80 TABLET, FILM COATED ORAL
Qty: 30 TABLET | Refills: 11 | Status: SHIPPED | OUTPATIENT
Start: 2019-02-25 | End: 2020-05-15 | Stop reason: SDUPTHER

## 2019-02-25 NOTE — TELEPHONE ENCOUNTER
----- Message from Deidra Caballero sent at 2/25/2019  8:31 AM EST -----  Contact: PT.  PT. IS NEEDING A REFILL ON: ZOCAR, 80 MG., TAKEN 1/DAY.    RX=[facility] [address].  RX STATED FAXED IN A REQUEST TO US; HAS NOT HEARD BACK FROM OFFICE AS OF YET.    PT. CAN BE REACHED @ [facility] #.

## 2019-10-31 ENCOUNTER — TELEPHONE (OUTPATIENT)
Dept: OTHER | Facility: OTHER | Age: 49
End: 2019-10-31

## 2019-10-31 ENCOUNTER — TRANSCRIBE ORDERS (OUTPATIENT)
Dept: FAMILY MEDICINE CLINIC | Facility: CLINIC | Age: 49
End: 2019-10-31

## 2019-10-31 DIAGNOSIS — I25.2 HISTORY OF HEART ATTACK: ICD-10-CM

## 2019-10-31 DIAGNOSIS — I10 HYPERTENSION, UNSPECIFIED TYPE: Primary | ICD-10-CM

## 2019-10-31 NOTE — TELEPHONE ENCOUNTER
Patient called in wanting to see if he can schedule an appointment with Dr. Frankie Lomeli for a follow up in regards to a heart attack he had a year ago. He is wanting to know if he'll need to see HERNANDEZ Watson first to receive the referral or if the referral can go ahead and be sent over to Dr. Lomeli's office. Patient also mentioned he will be starting a new job in [place] on November 25th, so he is wanting to get all these appointments scheduled before then if possible. Please contact patient and let him know what is decided.

## 2019-11-08 RX ORDER — FINASTERIDE 5 MG/1
TABLET, FILM COATED ORAL
Qty: 30 TABLET | Refills: 4 | Status: SHIPPED | OUTPATIENT
Start: 2019-11-08 | End: 2020-05-26 | Stop reason: SDUPTHER

## 2019-11-21 NOTE — PROGRESS NOTES
[facility]   OFFICE NOTE      Branden Aguayo  1970  PCP: Jl Watson PA    SUBJECTIVE:   Branden Aguayo is a 49 y.o. male seen for a follow up visit regarding the following:     CC:Vasovagal syncope    HPI:   49-year-old gentleman presents today for follow-up regarding vasovagal syncope and hospital admission in 2018.  This  work-up with Dr. Juan Antonio Islas included a  heart catheterization revealing no significant CAD and echocardiogram at that time.  He has had no recurrent episodes of syncope events.  He has changed his habits as far as hydration is been doing well with this.  He has had no recurrent vasovagal symptoms.  He denies any chest pain and he exercises routinely running 5 miles to 10 miles multiple days of the week with no symptoms of chest pain.  He also wants running will lift weights without any incident.  He has been have some difficulty with stress and anxiety because of work and trouble sleeping which she states has caused him some concern but he is working to improve upon this.  Overall he feels he is doing well.  He is taking his statin drug and is scheduled to follow-up lipid panel with his primary care provider.  He is taking aspirin daily.    Cardiac PMH: (Old records have been reviewed and summarized below)  1. Near syncope  a. Positive tronponin 0.59, 1.4.    b. Bradycardia, Stable  c. LHC 11/15/18, Nonobstructive CAD, Normal EF.   d. Echocardiogram, Aortic valve calcification, Normal EF.   2. HLD:  Statin.   3. Asymptomatic Bradycardia        Past Medical History, Past Surgical History, Family history, Social History, and Medications were all reviewed with the patient today and updated as necessary.       Current Outpatient Medications:   •  atorvastatin (LIPITOR) 80 MG tablet, Take 1 tablet by mouth every night at bedtime., Disp: 30 tablet, Rfl: 11  •  clindamycin (CLEOCIN) 300 MG capsule, , Disp: , Rfl:   •  finasteride (PROSCAR) 5 MG  tablet, TAKE ONE TABLET BY MOUTH DAILY, Disp: 30 tablet, Rfl: 4  •  Multiple Vitamins-Minerals (MULTIVITAMIN ADULT PO), Take 1 tablet by mouth Daily., Disp: , Rfl:       No Known Allergies  Patient Active Problem List   Diagnosis   • NSTEMI (non-ST elevated myocardial infarction) (CMS/HCC)     Past Medical History:   Diagnosis Date   • Hyperlipidemia      Past Surgical History:   Procedure Laterality Date   • CARDIAC CATHETERIZATION N/A 11/15/2018    Procedure: Left Heart Cath;  Surgeon: Igor Castañeda MD;  Location: [facility] CATH INVASIVE LOCATION;  Service: Cardiology   • COLONOSCOPY     • NOSE SURGERY      15 y/o-injury   • VASECTOMY  2011   • WISDOM TOOTH EXTRACTION       Family History   Problem Relation Age of Onset   • Cancer Mother    • Cancer Father      Social History     Tobacco Use   • Smoking status: Never Smoker   • Smokeless tobacco: Never Used   Substance Use Topics   • Alcohol use: Yes     Comment: 5/week       ROS:  Review of Symptoms:  General: no recent weight loss/gain, weakness or fatigue  Skin: no rashes, lumps, or other skin changes  HEENT: no dizziness, lightheadedness, or vision changes  Respiratory: no cough or hemoptysis  Cardiovascular: no palpitations, and tachycardia  Gastrointestinal: no black/tarry stools or diarrhea  Urinary: no change in frequency or urgency  Peripheral Vascular: no claudication or leg cramps  Musculoskeletal: no muscle or joint pain/stiffness  Psychiatric: + Recent Anxiety  Neurological: no sensory or motor loss, no syncope  Hematologic: no anemia, easy bruising or bleeding  Endocrine: no thyroid problems, nor heat or cold intolerance    PHYSICAL EXAM:    There were no vitals taken for this visit.       Wt Readings from Last 5 Encounters:   12/18/18 81.2 kg (179 lb)   11/19/18 80.4 kg (177 lb 3.2 oz)   11/14/18 78.9 kg (174 lb)   09/18/17 80.1 kg (176 lb 9.6 oz)       BP Readings from Last 5 Encounters:   12/18/18 110/76   11/19/18 116/72   11/15/18 120/71    09/18/17 116/84       General appearance - Alert, well appearing, and in no distress   Mental status - Affect appropriate to mood.  Eyes - Sclerae anicteric,  ENMT - Hearing grossly normal bilaterally, Dental hygiene good.  Neck - Carotids upstroke normal bilaterally, no bruits, no JVD.  Resp - Clear to auscultation, no wheezes, rales or rhonchi, symmetric air entry.  Heart - Normal rate, regular rhythm, normal S1, S2, no murmurs, rubs, clicks or gallops.  GI - Soft, nontender, nondistended, no masses or organomegaly.  Neurological - Grossly intact - normal speech, no focal findings  Musculoskeletal - No joint tenderness, deformity or swelling, no muscular tenderness noted.  Extremities - Peripheral pulses normal, no pedal edema, no clubbing or cyanosis.  Skin - Normal coloration and turgor.  Psych -  oriented to person, place, and time.    Medical problems and test results were reviewed with the patient today.     No results found for this or any previous visit (from the past 672 hour(s)).      Angiographic Findings:11/15/18  Right coronary dominant circulation  · Left main artery:   The vessel is normal sized and trifurcates into an anterior descending, ramus intermedius, circumflex artery.  There is no significant disease.  · Left anterior descending artery:  The vessel is normal sized and terminates in an apical artery.  There is no significant disease until the apex where the vessel has evidence of small vessel disease.  The first diagonal branch is medium-sized and without significant disease.  The second diagonal branch is medium-sized and without significant disease.  There is evidence of mild atherosclerosis in the septal arteries.  · Ramus intermedius artery: The vessel is medium-sized and has a 30% discrete ostial segment stenosis.  · Left circumflex artery:  The vessel is medium-sized and terminates in an AV groove artery.  There is a 20% discrete ostial segment stenosis.  There are minimal luminal  irregularities.  The first obtuse marginal branch is small sized and without significant disease.  The AV groove artery gives rise to 2 very small posterior lateral branches.  · Right coronary artery:  The vessel is large sized and bifurcates into an RPL S and RPDA.  There is no significant disease.  The RPL S is medium-sized without significant disease.  The RPDA is large sized and has a 20% discrete ostial segment stenosis.      EKG: (EKG has been independently visualized by me and summarized below)    ECG 12 Lead  Date/Time: 11/22/2019 12:27 PM  Performed by: Frankie Lomeli PA  Authorized by: Frankie Lomeli PA   Comparison: compared with previous ECG from 11/18/2018  Similar to previous ECG  Rhythm: sinus bradycardia  Rate: bradycardic  Conduction: conduction normal  ST Segments: ST segments normal  T Waves: T waves normal    Clinical impression: abnormal EKG          ASSESSMENT   1. CAD:  No Angina symptoms  2. VHD, calcification of aortic valve, no AS.  Normal EF 65%.   3. HLD:  Statin , High dose.  Follow up FLP with PCP  4. Asymptomatic Bradycardia   5. Vasovagal syncope, No recurrent events.    PLAN  · Continue to focus on risk factor modification  · Follow up one year or sooner as needed.     11/21/2019  2:27 PM    Will Yani STARKEY

## 2019-11-22 ENCOUNTER — OFFICE VISIT (OUTPATIENT)
Dept: CARDIOLOGY | Facility: CLINIC | Age: 49
End: 2019-11-22

## 2019-11-22 VITALS
DIASTOLIC BLOOD PRESSURE: 80 MMHG | WEIGHT: 181.8 LBS | SYSTOLIC BLOOD PRESSURE: 120 MMHG | BODY MASS INDEX: 26.03 KG/M2 | HEIGHT: 70 IN | HEART RATE: 54 BPM

## 2019-11-22 DIAGNOSIS — I21.4 NSTEMI (NON-ST ELEVATED MYOCARDIAL INFARCTION) (HCC): Primary | ICD-10-CM

## 2019-11-22 PROCEDURE — 99213 OFFICE O/P EST LOW 20 MIN: CPT | Performed by: PHYSICIAN ASSISTANT

## 2019-11-22 PROCEDURE — 93000 ELECTROCARDIOGRAM COMPLETE: CPT | Performed by: PHYSICIAN ASSISTANT

## 2019-11-22 RX ORDER — LANOLIN ALCOHOL/MO/W.PET/CERES
1000 CREAM (GRAM) TOPICAL DAILY
COMMUNITY
End: 2022-08-29

## 2019-11-22 RX ORDER — ASPIRIN 325 MG
81 TABLET, DELAYED RELEASE (ENTERIC COATED) ORAL DAILY
COMMUNITY

## 2020-02-19 ENCOUNTER — TELEPHONE (OUTPATIENT)
Dept: FAMILY MEDICINE CLINIC | Facility: CLINIC | Age: 50
End: 2020-02-19

## 2020-02-19 NOTE — TELEPHONE ENCOUNTER
Patient requested to have a lab order put in for him a week prior to his 3/3/20 appointment for a physical. Patient would like a full panel of labs, including PSA and cholesterol checks. Patient stated he takes medication that can affect his kidneys and liver and would like to test this as well. Patient is hoping this will allow enough time to go over his lab results at his appointment.    Please call and advise. Patient call back [phone]

## 2020-03-03 ENCOUNTER — OFFICE VISIT (OUTPATIENT)
Dept: FAMILY MEDICINE CLINIC | Facility: CLINIC | Age: 50
End: 2020-03-03

## 2020-03-03 VITALS
RESPIRATION RATE: 14 BRPM | OXYGEN SATURATION: 97 % | WEIGHT: 191.4 LBS | BODY MASS INDEX: 28.35 KG/M2 | DIASTOLIC BLOOD PRESSURE: 72 MMHG | HEIGHT: 69 IN | SYSTOLIC BLOOD PRESSURE: 110 MMHG | TEMPERATURE: 98 F | HEART RATE: 52 BPM

## 2020-03-03 DIAGNOSIS — Z12.5 SPECIAL SCREENING FOR MALIGNANT NEOPLASM OF PROSTATE: ICD-10-CM

## 2020-03-03 DIAGNOSIS — Z00.00 ROUTINE GENERAL MEDICAL EXAMINATION AT A HEALTH CARE FACILITY: Primary | ICD-10-CM

## 2020-03-03 LAB
BILIRUB BLD-MCNC: NEGATIVE MG/DL
CLARITY, POC: CLEAR
COLOR UR: YELLOW
GLUCOSE UR STRIP-MCNC: NEGATIVE MG/DL
KETONES UR QL: NEGATIVE
LEUKOCYTE EST, POC: NEGATIVE
NITRITE UR-MCNC: NEGATIVE MG/ML
PH UR: 7 [PH] (ref 5–8)
PROT UR STRIP-MCNC: NEGATIVE MG/DL
RBC # UR STRIP: NEGATIVE /UL
SP GR UR: 1.01 (ref 1–1.03)
UROBILINOGEN UR QL: NORMAL

## 2020-03-03 PROCEDURE — 99396 PREV VISIT EST AGE 40-64: CPT | Performed by: PHYSICIAN ASSISTANT

## 2020-03-03 PROCEDURE — 81003 URINALYSIS AUTO W/O SCOPE: CPT | Performed by: PHYSICIAN ASSISTANT

## 2020-03-03 NOTE — PROGRESS NOTES
Subjective   Branden Aguayo is a 50 y.o. male  Annual Exam (Not fasting. UTD on colonoscopy. Sees cardio. )      History of Present Illness  Patient is a 50-year-old white male who comes in for preventive examination denies any problem complaints no shortness of breath no chest pain no problems or complaints  The following portions of the patient's history were reviewed and updated as appropriate: allergies, current medications, past social history and problem list    Review of Systems   Constitutional: Negative for appetite change, diaphoresis, fatigue and unexpected weight change.   Eyes: Negative for visual disturbance.   Respiratory: Negative for cough, chest tightness and shortness of breath.    Cardiovascular: Negative for chest pain, palpitations and leg swelling.   Gastrointestinal: Negative for diarrhea, nausea and vomiting.   Endocrine: Negative for polydipsia, polyphagia and polyuria.   Skin: Negative for color change and rash.   Neurological: Negative for dizziness, syncope, weakness, light-headedness, numbness and headaches.       Objective     Vitals:    03/03/20 1346   BP: 110/72   Pulse: 52   Resp: 14   Temp: 98 °F (36.7 °C)   SpO2: 97%       Physical Exam   Constitutional: He appears well-developed and well-nourished.   Neck: Neck supple. No JVD present. No thyromegaly present.   Cardiovascular: Normal rate, regular rhythm, normal heart sounds, intact distal pulses and normal pulses.   No murmur heard.  Pulmonary/Chest: Effort normal and breath sounds normal. No respiratory distress.   Abdominal: Soft. Bowel sounds are normal. There is no hepatosplenomegaly. There is no tenderness.   Musculoskeletal: He exhibits no edema.   Lymphadenopathy:     He has no cervical adenopathy.   Neurological: No sensory deficit.   Skin: Skin is warm and dry. He is not diaphoretic.   Nursing note and vitals reviewed.      Assessment/Plan     Branden was seen today for annual exam.    Diagnoses and all orders for this  visit:    Routine general medical examination at a health care facility  -     POCT urinalysis dipstick, automated  -     CBC & Differential; Future  -     Comprehensive Metabolic Panel; Future  -     Lipid Panel; Future  -     TSH; Future    Special screening for malignant neoplasm of prostate  -     PSA Screen; Future    Preventive medicine discussed, diet, exercise discussed ways to improve overall health and diet

## 2020-03-16 ENCOUNTER — LAB (OUTPATIENT)
Dept: LAB | Facility: HOSPITAL | Age: 50
End: 2020-03-16

## 2020-03-16 DIAGNOSIS — Z00.00 ROUTINE GENERAL MEDICAL EXAMINATION AT A HEALTH CARE FACILITY: ICD-10-CM

## 2020-03-16 DIAGNOSIS — Z12.5 SPECIAL SCREENING FOR MALIGNANT NEOPLASM OF PROSTATE: ICD-10-CM

## 2020-03-16 LAB
ALBUMIN SERPL-MCNC: 4.6 G/DL (ref 3.5–5.2)
ALBUMIN/GLOB SERPL: 2.6 G/DL
ALP SERPL-CCNC: 67 U/L (ref 39–117)
ALT SERPL W P-5'-P-CCNC: 24 U/L (ref 1–41)
ANION GAP SERPL CALCULATED.3IONS-SCNC: 12.9 MMOL/L (ref 5–15)
AST SERPL-CCNC: 23 U/L (ref 1–40)
BASOPHILS # BLD AUTO: 0.02 10*3/MM3 (ref 0–0.2)
BASOPHILS NFR BLD AUTO: 0.5 % (ref 0–1.5)
BILIRUB SERPL-MCNC: 0.5 MG/DL (ref 0.2–1.2)
BUN BLD-MCNC: 17 MG/DL (ref 6–20)
BUN/CREAT SERPL: 18.3 (ref 7–25)
CALCIUM SPEC-SCNC: 8.9 MG/DL (ref 8.6–10.5)
CHLORIDE SERPL-SCNC: 100 MMOL/L (ref 98–107)
CHOLEST SERPL-MCNC: 164 MG/DL (ref 0–200)
CO2 SERPL-SCNC: 26.1 MMOL/L (ref 22–29)
CREAT BLD-MCNC: 0.93 MG/DL (ref 0.76–1.27)
DEPRECATED RDW RBC AUTO: 41.8 FL (ref 37–54)
EOSINOPHIL # BLD AUTO: 0.14 10*3/MM3 (ref 0–0.4)
EOSINOPHIL NFR BLD AUTO: 3.2 % (ref 0.3–6.2)
ERYTHROCYTE [DISTWIDTH] IN BLOOD BY AUTOMATED COUNT: 12.7 % (ref 12.3–15.4)
GFR SERPL CREATININE-BSD FRML MDRD: 86 ML/MIN/1.73
GLOBULIN UR ELPH-MCNC: 1.8 GM/DL
GLUCOSE BLD-MCNC: 85 MG/DL (ref 65–99)
HCT VFR BLD AUTO: 42.6 % (ref 37.5–51)
HDLC SERPL-MCNC: 55 MG/DL (ref 40–60)
HGB BLD-MCNC: 14.5 G/DL (ref 13–17.7)
IMM GRANULOCYTES # BLD AUTO: 0.01 10*3/MM3 (ref 0–0.05)
IMM GRANULOCYTES NFR BLD AUTO: 0.2 % (ref 0–0.5)
LDLC SERPL CALC-MCNC: 91 MG/DL (ref 0–100)
LDLC/HDLC SERPL: 1.65 {RATIO}
LYMPHOCYTES # BLD AUTO: 2 10*3/MM3 (ref 0.7–3.1)
LYMPHOCYTES NFR BLD AUTO: 46 % (ref 19.6–45.3)
MCH RBC QN AUTO: 31 PG (ref 26.6–33)
MCHC RBC AUTO-ENTMCNC: 34 G/DL (ref 31.5–35.7)
MCV RBC AUTO: 91.2 FL (ref 79–97)
MONOCYTES # BLD AUTO: 0.5 10*3/MM3 (ref 0.1–0.9)
MONOCYTES NFR BLD AUTO: 11.5 % (ref 5–12)
NEUTROPHILS # BLD AUTO: 1.68 10*3/MM3 (ref 1.7–7)
NEUTROPHILS NFR BLD AUTO: 38.6 % (ref 42.7–76)
NRBC BLD AUTO-RTO: 0 /100 WBC (ref 0–0.2)
PLATELET # BLD AUTO: 181 10*3/MM3 (ref 140–450)
PMV BLD AUTO: 11.4 FL (ref 6–12)
POTASSIUM BLD-SCNC: 4.6 MMOL/L (ref 3.5–5.2)
PROT SERPL-MCNC: 6.4 G/DL (ref 6–8.5)
PSA SERPL-MCNC: 0.33 NG/ML (ref 0–4)
RBC # BLD AUTO: 4.67 10*6/MM3 (ref 4.14–5.8)
SODIUM BLD-SCNC: 139 MMOL/L (ref 136–145)
TRIGL SERPL-MCNC: 90 MG/DL (ref 0–150)
TSH SERPL DL<=0.05 MIU/L-ACNC: 6.89 UIU/ML (ref 0.27–4.2)
VLDLC SERPL-MCNC: 18 MG/DL (ref 5–40)
WBC NRBC COR # BLD: 4.35 10*3/MM3 (ref 3.4–10.8)

## 2020-03-16 PROCEDURE — 36415 COLL VENOUS BLD VENIPUNCTURE: CPT

## 2020-03-16 PROCEDURE — 80061 LIPID PANEL: CPT

## 2020-03-16 PROCEDURE — 80053 COMPREHEN METABOLIC PANEL: CPT

## 2020-03-16 PROCEDURE — G0103 PSA SCREENING: HCPCS

## 2020-03-16 PROCEDURE — 84443 ASSAY THYROID STIM HORMONE: CPT

## 2020-03-16 PROCEDURE — 85025 COMPLETE CBC W/AUTO DIFF WBC: CPT

## 2020-03-24 ENCOUNTER — TELEPHONE (OUTPATIENT)
Dept: FAMILY MEDICINE CLINIC | Facility: CLINIC | Age: 50
End: 2020-03-24

## 2020-03-24 RX ORDER — LEVOTHYROXINE SODIUM 0.03 MG/1
25 TABLET ORAL DAILY
Qty: 30 TABLET | Refills: 5 | Status: SHIPPED | OUTPATIENT
Start: 2020-03-24 | End: 2020-10-26

## 2020-03-24 NOTE — TELEPHONE ENCOUNTER
Pt called and stated he received a letter stating he needed to start medication for hypothyroidism. Please advise pt when thyroid medication is called in. [phone]

## 2020-04-13 ENCOUNTER — TELEPHONE (OUTPATIENT)
Dept: FAMILY MEDICINE CLINIC | Facility: CLINIC | Age: 50
End: 2020-04-13

## 2020-04-13 NOTE — TELEPHONE ENCOUNTER
PT. SEE'S AKIRA.  PT. WAS TOLD TO CONTACT OFFICE, PER HIS WIFE.    PT. IS EXPERIENCING HEART PALPITATIONS ON & OFF, WHICH MAKES HIM COUGH @ REST.    PT. IS NOT WANTING TO GO TO AN ER.     PT. IS WANTING A REFERRAL TO A CARDIOLOGIST.    PT. CAN BE REACHED @ CELL PHONE #: [phone].

## 2020-04-14 DIAGNOSIS — R00.2 PALPITATIONS: Primary | ICD-10-CM

## 2020-04-22 ENCOUNTER — TELEPHONE (OUTPATIENT)
Dept: FAMILY MEDICINE CLINIC | Facility: CLINIC | Age: 50
End: 2020-04-22

## 2020-04-22 DIAGNOSIS — R00.2 PALPITATIONS: Primary | ICD-10-CM

## 2020-04-24 DIAGNOSIS — R00.2 PALPITATIONS: Primary | ICD-10-CM

## 2020-05-15 RX ORDER — ATORVASTATIN CALCIUM 80 MG/1
80 TABLET, FILM COATED ORAL
Qty: 30 TABLET | Refills: 11 | Status: SHIPPED | OUTPATIENT
Start: 2020-05-15 | End: 2021-06-07 | Stop reason: SDUPTHER

## 2020-05-15 NOTE — TELEPHONE ENCOUNTER
Pt called to request a refill of     atorvastatin (LIPITOR) 80 MG tablet    [facility]: [facility]  PH: [phone]  FAX: [phone]

## 2020-05-28 ENCOUNTER — OFFICE VISIT (OUTPATIENT)
Dept: CARDIOLOGY | Facility: CLINIC | Age: 50
End: 2020-05-28

## 2020-05-28 VITALS
TEMPERATURE: 97.9 F | WEIGHT: 184.8 LBS | BODY MASS INDEX: 26.45 KG/M2 | HEART RATE: 50 BPM | HEIGHT: 70 IN | OXYGEN SATURATION: 96 % | DIASTOLIC BLOOD PRESSURE: 78 MMHG | SYSTOLIC BLOOD PRESSURE: 124 MMHG

## 2020-05-28 DIAGNOSIS — R00.2 PALPITATIONS: Primary | ICD-10-CM

## 2020-05-28 PROCEDURE — 99213 OFFICE O/P EST LOW 20 MIN: CPT | Performed by: PHYSICIAN ASSISTANT

## 2020-05-28 RX ORDER — FINASTERIDE 5 MG/1
5 TABLET, FILM COATED ORAL DAILY
Qty: 30 TABLET | Refills: 4 | Status: SHIPPED | OUTPATIENT
Start: 2020-05-28 | End: 2022-08-29

## 2020-05-28 NOTE — PROGRESS NOTES
[facility] at [facility]   OFFICE NOTE      Branden Aguayo  1970  PCP: Jl Watson PA    SUBJECTIVE:   Branden Aguayo is a 50 y.o. male seen for a follow up visit regarding the following:     CC:Palpitations     HPI:   Pleasant 50-year old white male known to our office presents today for a new problem regarding palpitations.  Patient is known to our office from a prior history of vasovagal event which led to hospitalization and left heart catheterization revealing no significant disease.  Echocardiogram the time of normal EF with mild aortic calcification no aortic valve stenosis.  He reports he is had no recurrent episodes of syncope.  He tries to hydrate well.  However in the past couple months he began feeling fatigued and weak.  Lab work revealed mild hypothyroidism.  He started Synthroid therapy started feeling better.  However in the interim he also has developed some significant amount of palpitations.  This was occuring almost on a daily basis but have decreased somewhat in nature.  He wore a ZIO monitor for our office recently.  In view of the new onset palpitations and ZIO monitor revealing PACs now presents her office for evaluation.  He denies any chest pain or chest tightness with exertion suggesting aqngina pectoris.  Denies any sudden syncope events.  Denies any heart failure symptoms.    Cardiac PMH: (Old records have been reviewed and summarized below)  1. Near syncope  a. Positive tronponin 0.59, 1.4.    b. Bradycardia, Stable  c. LHC 11/15/18, Nonobstructive CAD, Normal EF.   d. Echocardiogram, Aortic valve calcification, Normal EF.   2. HLD:  Statin.   3. Asymptomatic Bradycardia  4. Palpitations: Zio monitor with Occasional PACS, Less than 1% burden. One episode of Atrial tachycardia 4 beats.     Past Medical History, Past Surgical History, Family history, Social History, and Medications were all reviewed with the patient today and updated as  necessary.       Current Outpatient Medications:   •  aspirin  MG tablet, Take 243 mg by mouth Daily., Disp: , Rfl:   •  atorvastatin (LIPITOR) 80 MG tablet, Take 1 tablet by mouth every night at bedtime., Disp: 30 tablet, Rfl: 11  •  DHA-EPA-Vitamin E (OMEGA-3 COMPLEX PO), Take  by mouth., Disp: , Rfl:   •  finasteride (PROSCAR) 5 MG tablet, Take 1 tablet by mouth Daily., Disp: 30 tablet, Rfl: 4  •  levothyroxine (Synthroid) 25 MCG tablet, Take 1 tablet by mouth Daily., Disp: 30 tablet, Rfl: 5  •  Multiple Vitamins-Minerals (MULTIVITAMIN ADULT PO), Take 1 tablet by mouth Daily., Disp: , Rfl:   •  vitamin B-12 (CYANOCOBALAMIN) 1000 MCG tablet, Take 1,000 mcg by mouth Daily., Disp: , Rfl:       Allergies   Allergen Reactions   • Penicillins Rash     Patient Active Problem List   Diagnosis   • NSTEMI (non-ST elevated myocardial infarction) (CMS/HCC)     Past Medical History:   Diagnosis Date   • Hyperlipidemia      Past Surgical History:   Procedure Laterality Date   • CARDIAC CATHETERIZATION N/A 11/15/2018    Procedure: Left Heart Cath;  Surgeon: Igor Castañeda MD;  Location: [facility] CATH INVASIVE LOCATION;  Service: Cardiology   • COLONOSCOPY     • NOSE SURGERY      17 y/o-injury   • VASECTOMY  2011   • WISDOM TOOTH EXTRACTION       Family History   Problem Relation Age of Onset   • Cancer Mother    • Cancer Father      Social History     Tobacco Use   • Smoking status: Never Smoker   • Smokeless tobacco: Never Used   Substance Use Topics   • Alcohol use: Yes     Alcohol/week: 2.0 standard drinks     Types: 2 Cans of beer per week     Binge frequency: Daily or almost daily       ROS:  Review of Symptoms:  General: no recent weight loss/gain, weakness or fatigue  Skin: no rashes, lumps, or other skin changes  HEENT: no dizziness, lightheadedness, or vision changes  Respiratory: no cough or hemoptysis  Cardiovascular: + palpitations, and tachycardia  Gastrointestinal: no black/tarry stools or diarrhea  Urinary:  "no change in frequency or urgency  Peripheral Vascular: no claudication or leg cramps  Musculoskeletal: no muscle or joint pain/stiffness  Psychiatric: no depression or excessive stress  Neurological: no sensory or motor loss, no syncope  Hematologic: no anemia, easy bruising or bleeding  Endocrine: no thyroid problems, nor heat or cold intolerance    PHYSICAL EXAM:    /78 (BP Location: Left arm, Patient Position: Sitting)   Pulse 50   Temp 97.9 °F (36.6 °C)   Ht 176.5 cm (69.5\")   Wt 83.8 kg (184 lb 12.8 oz)   SpO2 96%   BMI 26.90 kg/m²        Wt Readings from Last 5 Encounters:   05/28/20 83.8 kg (184 lb 12.8 oz)   03/03/20 86.8 kg (191 lb 6.4 oz)   11/22/19 82.5 kg (181 lb 12.8 oz)   12/18/18 81.2 kg (179 lb)   11/19/18 80.4 kg (177 lb 3.2 oz)       BP Readings from Last 5 Encounters:   05/28/20 124/78   03/03/20 110/72   11/22/19 120/80   12/18/18 110/76   11/19/18 116/72       General appearance - Alert, well appearing, and in no distress   Mental status - Affect appropriate to mood.  Eyes - Sclerae anicteric,  ENMT - Hearing grossly normal bilaterally, Dental hygiene good.  Neck - Carotids upstroke normal bilaterally, no bruits, no JVD.  Resp - Clear to auscultation, no wheezes, rales or rhonchi, symmetric air entry.  Heart - Normal rate, regular rhythm, normal S1, S2, 2/6 ANNETTE no  rubs, clicks or gallops.  GI - Soft, nontender, nondistended, no masses or organomegaly.  Neurological - Grossly intact - normal speech, no focal findings  Musculoskeletal - No joint tenderness, deformity or swelling, no muscular tenderness noted.  Extremities - Peripheral pulses normal, no pedal edema, no clubbing or cyanosis.  Skin - Normal coloration and turgor.  Psych -  oriented to person, place, and time.    Medical problems and test results were reviewed with the patient today.       ASSESSMENT   1. Palpitations: Zio monitor, Occasional PAC's 1%, short run of AT 4 beats. Avg HR 63. Less than 1% PVC's.   2. " Vasovagal syncope: No recurrent events  3. HLD: Statin  4. VHD: Aortic valve calcification, No AS Normal EF 65% 2018.   5. Nonobstructive CAD by [facility] 11/5/18.   6. Hypothyroidism:  On Synthroid     PLAN  · Minimally symptomatic PACs by O monitor.  These are in the setting of new diagnosis of hypothyroidism and recent initiation of Synthroid.  We will continue to monitor for now.  Limit caffeine intake lifestyle modification.  If symptoms progress in nature will consider referral to electrophysiology for further evaluation.  · Return for follow-up in 6 months or sooner as needed.    5/28/2020  10:08    Will Yani STARKEY

## 2020-09-08 ENCOUNTER — LAB (OUTPATIENT)
Dept: LAB | Facility: HOSPITAL | Age: 50
End: 2020-09-08

## 2020-09-08 ENCOUNTER — OFFICE VISIT (OUTPATIENT)
Dept: FAMILY MEDICINE CLINIC | Facility: CLINIC | Age: 50
End: 2020-09-08

## 2020-09-08 VITALS
SYSTOLIC BLOOD PRESSURE: 118 MMHG | OXYGEN SATURATION: 98 % | HEART RATE: 46 BPM | HEIGHT: 69 IN | WEIGHT: 181 LBS | TEMPERATURE: 97.8 F | BODY MASS INDEX: 26.81 KG/M2 | DIASTOLIC BLOOD PRESSURE: 70 MMHG | RESPIRATION RATE: 14 BRPM

## 2020-09-08 DIAGNOSIS — E03.9 HYPOTHYROIDISM, UNSPECIFIED TYPE: ICD-10-CM

## 2020-09-08 DIAGNOSIS — R21 RASH: ICD-10-CM

## 2020-09-08 DIAGNOSIS — E03.9 HYPOTHYROIDISM, UNSPECIFIED TYPE: Primary | ICD-10-CM

## 2020-09-08 LAB — TSH SERPL DL<=0.05 MIU/L-ACNC: 3.89 UIU/ML (ref 0.27–4.2)

## 2020-09-08 PROCEDURE — 36415 COLL VENOUS BLD VENIPUNCTURE: CPT

## 2020-09-08 PROCEDURE — 84443 ASSAY THYROID STIM HORMONE: CPT

## 2020-09-08 PROCEDURE — 99214 OFFICE O/P EST MOD 30 MIN: CPT | Performed by: PHYSICIAN ASSISTANT

## 2020-09-08 NOTE — PROGRESS NOTES
Subjective   Branden Aguayo is a 50 y.o. male  Hypothyroidism (F/U-due for repeat labs) and Skin Problem (LLE x1 1/2 yrs, red, dry)      History of Present Illness  Patient is a pleasant 50-year-old white male who comes in for follow-up hypothyroidism taking levothyroxine 25 mcg every day doing well meds working    Patient also complains of scaly patch on lower left leg patch is scaly irritated has appointment dermatology Thursday  The following portions of the patient's history were reviewed and updated as appropriate: allergies, current medications, past social history and problem list    Review of Systems   Constitutional: Negative for fatigue and unexpected weight change.   Respiratory: Negative for cough, chest tightness and shortness of breath.    Cardiovascular: Negative for chest pain, palpitations and leg swelling.   Gastrointestinal: Negative for nausea.   Skin: Positive for rash. Negative for color change.   Neurological: Negative for dizziness, syncope, weakness and headaches.       Objective     Vitals:    09/08/20 1643   BP: 118/70   Pulse: (!) 46   Resp: 14   Temp: 97.8 °F (36.6 °C)   SpO2: 98%       Physical Exam   Constitutional: He appears well-developed and well-nourished.   Neck: No JVD present.   Cardiovascular: Normal rate, regular rhythm, normal heart sounds, intact distal pulses and normal pulses.   No murmur heard.  Pulmonary/Chest: Effort normal and breath sounds normal. No respiratory distress.   Abdominal: Soft. Bowel sounds are normal. There is no hepatosplenomegaly. There is no tenderness.   Musculoskeletal: He exhibits no edema.   Skin: Skin is warm and dry.        Nursing note and vitals reviewed.      Assessment/Plan     Branden was seen today for hypothyroidism and skin problem.    Diagnoses and all orders for this visit:    Hypothyroidism, unspecified type  -     TSH; Future    Rash  -     fluocinonide-emollient (LIDEX-E) 0.05 % cream; Apply  topically to the appropriate area as  directed 2 (Two) Times a Day.

## 2020-10-26 RX ORDER — LEVOTHYROXINE SODIUM 0.03 MG/1
TABLET ORAL
Qty: 30 TABLET | Refills: 11 | Status: SHIPPED | OUTPATIENT
Start: 2020-10-26 | End: 2022-04-04

## 2020-11-24 ENCOUNTER — OFFICE VISIT (OUTPATIENT)
Dept: CARDIOLOGY | Facility: CLINIC | Age: 50
End: 2020-11-24

## 2020-11-24 VITALS
WEIGHT: 186.8 LBS | HEIGHT: 69 IN | BODY MASS INDEX: 27.67 KG/M2 | OXYGEN SATURATION: 98 % | SYSTOLIC BLOOD PRESSURE: 118 MMHG | HEART RATE: 45 BPM | DIASTOLIC BLOOD PRESSURE: 82 MMHG

## 2020-11-24 DIAGNOSIS — R00.2 PALPITATIONS: Primary | ICD-10-CM

## 2020-11-24 DIAGNOSIS — R55 NEAR SYNCOPE: ICD-10-CM

## 2020-11-24 DIAGNOSIS — R00.1 BRADYCARDIA, SINUS: ICD-10-CM

## 2020-11-24 PROCEDURE — 93000 ELECTROCARDIOGRAM COMPLETE: CPT | Performed by: PHYSICIAN ASSISTANT

## 2020-11-24 PROCEDURE — 99213 OFFICE O/P EST LOW 20 MIN: CPT | Performed by: PHYSICIAN ASSISTANT

## 2020-11-24 NOTE — PROGRESS NOTES
Branden Aguayo  1970  [phone]    11/24/2020    [facility] CARDIOLOGY     Watson, Jl Romano, PA  [address] [facility] [address]  [place] [address]    Chief Complaint   Patient presents with   • Palpitations       Problem List:   1. Near syncope  a. Positive tronponin 0.59, 1.4.    b. Bradycardia, Stable  c. LHC 11/15/18, Nonobstructive CAD, Normal EF.   d. Echocardiogram, Aortic valve calcification, Normal EF  2. Palpitations  a. ZioPatch 4/2020: occasional PACs 1%, short run of AT 4 beats, average HR 63 bpm. Less than 1% PVCs.   3. HLD:  Statin.   4. Asymptomatic Bradycardia  5. Hypothyroidism     Allergies  Allergies   Allergen Reactions   • Penicillins Rash       Current Medications    Current Outpatient Medications:   •  aspirin  MG tablet, Take 162 mg by mouth Daily., Disp: , Rfl:   •  atorvastatin (LIPITOR) 80 MG tablet, Take 1 tablet by mouth every night at bedtime., Disp: 30 tablet, Rfl: 11  •  DHA-EPA-Vitamin E (OMEGA-3 COMPLEX PO), Take  by mouth., Disp: , Rfl:   •  finasteride (PROSCAR) 5 MG tablet, Take 1 tablet by mouth Daily., Disp: 30 tablet, Rfl: 4  •  fluocinonide-emollient (LIDEX-E) 0.05 % cream, Apply  topically to the appropriate area as directed 2 (Two) Times a Day., Disp: 30 g, Rfl: 1  •  levothyroxine (SYNTHROID, LEVOTHROID) 25 MCG tablet, TAKE ONE TABLET BY MOUTH DAILY, Disp: 30 tablet, Rfl: 11  •  Multiple Vitamins-Minerals (MULTIVITAMIN ADULT PO), Take 1 tablet by mouth Daily., Disp: , Rfl:   •  vitamin B-12 (CYANOCOBALAMIN) 1000 MCG tablet, Take 1,000 mcg by mouth Daily., Disp: , Rfl:     History of Present Illness:     Pt presents for follow up of palpitations and history of syncope. He is followed routinely by Scotty FERRIS. Since he was last here, he has overall been doing well. He has been running, usually about 5 miles. Last week, a couple of times, he had to stop due to a fluttering in his chest. He was able to keep going and  "slowed down, and everything was fine. No CP or SOB, or fatigue. No near syncope or syncope. No diaphoresis. He has been running since then, but keeping his -150 bpm. Last TSH in September was normal. BP has not been an issue.     ROS:  General:  Denies fatigue ,-  weight gain or loss  Cardiovascular:  Denies CP, PND, syncope, near syncope, edema + palpitations.  Pulmonary:  Denies MORALES, cough, or wheezing      Vitals:    11/24/20 1013   BP: 118/82   BP Location: Right arm   Patient Position: Sitting   Pulse: (!) 45   SpO2: 98%   Weight: 84.7 kg (186 lb 12.8 oz)   Height: 175.3 cm (69\")     Body mass index is 27.59 kg/m².  PE:  General: NAD  Neck: no JVD, no carotid bruits, no TM  Heart RRR, NL S1, S2, S4 present, no rubs, murmurs  Lungs: CTA, no wheezes, rhonchi, or rales  Abd: soft, non-tender, NL BS  Ext: No musculoskeletal deformities, no edema, cyanosis, or clubbing  Psych: normal mood and affect    Diagnostic Data:        ECG 12 Lead    Date/Time: 11/24/2020 10:55 AM  Performed by: Gwen Monsivais PA  Authorized by: Gwen Monsivais PA   Comparison: compared with previous ECG from 11/22/2019  Similar to previous ECG  Rhythm: sinus bradycardia  BPM: 45  Conduction: 1st degree AV block            1. Palpitations    2. Near syncope    3. Bradycardia, sinus          Plan:  1. Palpitations:  - PACs/PVCs/NSAT noted on last monitor. No significant recurrences. Will monitor for now.     2. Near Syncope:  - no recurrence, doing well.     3. Asymptomatic Bradycardia:  - patient is an avid runner and is asymptomatic    F/up in 8  Months with Scotty Lomeli PA-C    Electronically signed by HERNANDEZ Salmon, 11/24/20, 10:24 AM EST.        "

## 2021-02-01 ENCOUNTER — IMMUNIZATION (OUTPATIENT)
Dept: VACCINE CLINIC | Facility: HOSPITAL | Age: 51
End: 2021-02-01

## 2021-02-01 PROCEDURE — 91300 HC SARSCOV02 VAC 30MCG/0.3ML IM: CPT | Performed by: INTERNAL MEDICINE

## 2021-02-01 PROCEDURE — 0001A: CPT | Performed by: INTERNAL MEDICINE

## 2021-02-22 ENCOUNTER — IMMUNIZATION (OUTPATIENT)
Dept: VACCINE CLINIC | Facility: HOSPITAL | Age: 51
End: 2021-02-22

## 2021-02-22 PROCEDURE — 91300 HC SARSCOV02 VAC 30MCG/0.3ML IM: CPT | Performed by: INTERNAL MEDICINE

## 2021-02-22 PROCEDURE — 0002A: CPT | Performed by: INTERNAL MEDICINE

## 2021-06-04 DIAGNOSIS — E78.5 HYPERLIPIDEMIA, UNSPECIFIED HYPERLIPIDEMIA TYPE: Primary | ICD-10-CM

## 2021-06-07 RX ORDER — ATORVASTATIN CALCIUM 80 MG/1
TABLET, FILM COATED ORAL
Qty: 30 TABLET | Refills: 5 | Status: SHIPPED | OUTPATIENT
Start: 2021-06-07 | End: 2022-01-05

## 2021-07-30 ENCOUNTER — OFFICE VISIT (OUTPATIENT)
Dept: CARDIOLOGY | Facility: CLINIC | Age: 51
End: 2021-07-30

## 2021-07-30 VITALS
WEIGHT: 185 LBS | HEART RATE: 63 BPM | HEIGHT: 69 IN | SYSTOLIC BLOOD PRESSURE: 100 MMHG | BODY MASS INDEX: 27.4 KG/M2 | OXYGEN SATURATION: 96 % | DIASTOLIC BLOOD PRESSURE: 73 MMHG

## 2021-07-30 DIAGNOSIS — E78.00 PURE HYPERCHOLESTEROLEMIA: Primary | ICD-10-CM

## 2021-07-30 PROCEDURE — 99213 OFFICE O/P EST LOW 20 MIN: CPT | Performed by: PHYSICIAN ASSISTANT

## 2021-07-30 NOTE — PROGRESS NOTES
[facility] at [facility]   OFFICE NOTE      Branden Aguayo  1970  PCP: Jl Watson PA    SUBJECTIVE:   Branden Aguayo is a 51 y.o. male seen for a follow up visit regarding the following:     CC: Vasovagal syncope    HPI:   51-year-old gentleman returns today follow-up regarding vasovagal syncope known coronary disease by remote heart catheterization.  Reports that he has not had any recurrent major events of syncope.  He has had occasional lightheaded episodes if he has too much caffeine.  He is trying to monitor his fluid intake.  He has not any chest pain or chest tightness suggesting angina pectoris.  He denies any tachypalpitations or CHF symptoms.  Overall he feels well is trying to exercise but has not his condition as he normally has he is try to get back into exercising had to lay off because of a right knee discomfort.  Overall he feels he does well as long as he maintains his lifestyle with appropriate hydration restriction of caffeine he has no recurrent near syncope events.    Cardiac PMH: (Old records have been reviewed and summarized below)  1. Near syncope  a. Positive tronponin 0.59, 1.4.    b. Bradycardia, Stable  c. LHC 11/15/18, Nonobstructive CAD, Normal EF.   d. Echocardiogram, Aortic valve calcification, Normal EF  2. Palpitations  a. Jose 4/2020: occasional PACs 1%, short run of AT 4 beats, average HR 63 bpm. Less than 1% PVCs.   3. HLD:  Statin.   4. Asymptomatic Bradycardia  5. Hypothyroidism        Past Medical History, Past Surgical History, Family history, Social History, and Medications were all reviewed with the patient today and updated as necessary.       Current Outpatient Medications:   •  aspirin  MG tablet, Take 162 mg by mouth Daily., Disp: , Rfl:   •  atorvastatin (LIPITOR) 80 MG tablet, TAKE ONE TABLET BY MOUTH EVERY NIGHT AT BEDTIME, Disp: 30 tablet, Rfl: 5  •  DHA-EPA-Vitamin E (OMEGA-3 COMPLEX PO), Take  by mouth., Disp:  , Rfl:   •  fluocinonide-emollient (LIDEX-E) 0.05 % cream, Apply  topically to the appropriate area as directed 2 (Two) Times a Day., Disp: 30 g, Rfl: 1  •  levothyroxine (SYNTHROID, LEVOTHROID) 25 MCG tablet, TAKE ONE TABLET BY MOUTH DAILY, Disp: 30 tablet, Rfl: 11  •  Multiple Vitamins-Minerals (MULTIVITAMIN ADULT PO), Take 1 tablet by mouth Daily., Disp: , Rfl:   •  finasteride (PROSCAR) 5 MG tablet, Take 1 tablet by mouth Daily., Disp: 30 tablet, Rfl: 4  •  vitamin B-12 (CYANOCOBALAMIN) 1000 MCG tablet, Take 1,000 mcg by mouth Daily., Disp: , Rfl:       Allergies   Allergen Reactions   • Penicillins Rash     Patient Active Problem List   Diagnosis   • NSTEMI (non-ST elevated myocardial infarction) (CMS/HCC)   • Palpitations   • Near syncope   • Bradycardia, sinus     Past Medical History:   Diagnosis Date   • Bradycardia    • Hyperlipidemia    • Hyperthyroidism    • NSTEMI (non-ST elevated myocardial infarction) (CMS/HCC)    • Palpitations      Past Surgical History:   Procedure Laterality Date   • CARDIAC CATHETERIZATION N/A 11/15/2018    Procedure: Left Heart Cath;  Surgeon: Igor Castañeda MD;  Location: [facility] CATH INVASIVE LOCATION;  Service: Cardiology   • COLONOSCOPY     • NOSE SURGERY      15 y/o-injury   • VASECTOMY  2011   • WISDOM TOOTH EXTRACTION       Family History   Problem Relation Age of Onset   • Cancer Mother    • Cancer Father      Social History     Tobacco Use   • Smoking status: Never Smoker   • Smokeless tobacco: Never Used   Substance Use Topics   • Alcohol use: Yes     Alcohol/week: 2.0 standard drinks     Types: 2 Cans of beer per week       ROS:  Review of Symptoms:  General: no recent weight loss/gain, weakness or fatigue  Skin: no rashes, lumps, or other skin changes  HEENT: + dizziness, lightheadedness,  vision changes  Respiratory: no cough or hemoptysis  Cardiovascular: no palpitations, and tachycardia  Gastrointestinal: no black/tarry stools or diarrhea  Urinary: no change in  "frequency or urgency  Peripheral Vascular: no claudication or leg cramps  Musculoskeletal: + Knee pain  Psychiatric: no depression or excessive stress  Neurological: no sensory or motor loss, no syncope  Hematologic: no anemia, easy bruising or bleeding  Endocrine: no thyroid problems, nor heat or cold intolerance    PHYSICAL EXAM:    /73   Pulse 63   Ht 175.3 cm (69.02\")   Wt 83.9 kg (185 lb)   SpO2 96%   BMI 27.31 kg/m²        Wt Readings from Last 5 Encounters:   07/30/21 83.9 kg (185 lb)   11/24/20 84.7 kg (186 lb 12.8 oz)   09/08/20 82.1 kg (181 lb)   05/28/20 83.8 kg (184 lb 12.8 oz)   03/03/20 86.8 kg (191 lb 6.4 oz)       BP Readings from Last 5 Encounters:   07/30/21 100/73   11/24/20 118/82   09/08/20 118/70   05/28/20 124/78   03/03/20 110/72       General appearance - Alert, well appearing, and in no distress   Mental status - Affect appropriate to mood.  Eyes - Sclerae anicteric,  ENMT - Hearing grossly normal bilaterally, Dental hygiene good.  Neck - Carotids upstroke normal bilaterally, no bruits, no JVD.  Resp - Clear to auscultation, no wheezes, rales or rhonchi, symmetric air entry.  Heart - Normal rate, regular rhythm, normal S1, S2, no murmurs, rubs, clicks or gallops.  GI - Soft, nontender, nondistended, no masses or organomegaly.  Neurological - Grossly intact - normal speech, no focal findings  Musculoskeletal - No joint tenderness, deformity or swelling, no muscular tenderness noted.  Extremities - Peripheral pulses normal, no pedal edema, no clubbing or cyanosis.  Skin - Normal coloration and turgor.  Psych -  oriented to person, place, and time.    Medical problems and test results were reviewed with the patient today.     No results found for this or any previous visit (from the past 672 hour(s)).        ASSESSMENT   1. Palpitations: Stable on last monitor, rare PaCS/PVC's/NSAT  2. VVS: no recurrent events. Marginal SBP.   3. Sympathic bradycardia  4. Nonobstructive CAD by [facility] " 11/15/18-On Asa, Statin. No angina symptoms  5.  Echocardiogram clacification of aortic valve, No AS.Normal EF. Follow up Echo on next visit.      PLAN  · Limit caffeine, increase hydration to over 80 ounces of water per day.  · He has not had lab work over a year would recommend follow-up lipid panel thyroid panel and CMP.  He is searching for another primary care provider closer to his location.  · Follow up Echocardiogram on next visit. Or sooner if any change in symptoms.   · Return to follow-up in 1 year or sooner if any change in symptoms.      7/30/2021  10:34 EDT    Will Yani STARKEY

## 2021-08-10 ENCOUNTER — LAB (OUTPATIENT)
Dept: LAB | Facility: HOSPITAL | Age: 51
End: 2021-08-10

## 2021-08-10 DIAGNOSIS — E78.00 PURE HYPERCHOLESTEROLEMIA: ICD-10-CM

## 2021-08-10 PROCEDURE — 36415 COLL VENOUS BLD VENIPUNCTURE: CPT

## 2021-08-10 PROCEDURE — 80053 COMPREHEN METABOLIC PANEL: CPT

## 2021-08-10 PROCEDURE — 80061 LIPID PANEL: CPT

## 2021-08-10 PROCEDURE — 84436 ASSAY OF TOTAL THYROXINE: CPT

## 2021-08-10 PROCEDURE — 84443 ASSAY THYROID STIM HORMONE: CPT

## 2021-08-10 PROCEDURE — 84479 ASSAY OF THYROID (T3 OR T4): CPT

## 2021-08-11 LAB
ALBUMIN SERPL-MCNC: 4.6 G/DL (ref 3.5–5.2)
ALBUMIN/GLOB SERPL: 3.5 G/DL
ALP SERPL-CCNC: 66 U/L (ref 39–117)
ALT SERPL W P-5'-P-CCNC: 21 U/L (ref 1–41)
ANION GAP SERPL CALCULATED.3IONS-SCNC: 7.1 MMOL/L (ref 5–15)
AST SERPL-CCNC: 9 U/L (ref 1–40)
BILIRUB SERPL-MCNC: 0.3 MG/DL (ref 0–1.2)
BUN SERPL-MCNC: 15 MG/DL (ref 6–20)
BUN/CREAT SERPL: 13 (ref 7–25)
CALCIUM SPEC-SCNC: 9.3 MG/DL (ref 8.6–10.5)
CHLORIDE SERPL-SCNC: 104 MMOL/L (ref 98–107)
CHOLEST SERPL-MCNC: 173 MG/DL (ref 0–200)
CO2 SERPL-SCNC: 29.9 MMOL/L (ref 22–29)
CREAT SERPL-MCNC: 1.15 MG/DL (ref 0.76–1.27)
GFR SERPL CREATININE-BSD FRML MDRD: 67 ML/MIN/1.73
GLOBULIN UR ELPH-MCNC: 1.3 GM/DL
GLUCOSE SERPL-MCNC: 86 MG/DL (ref 65–99)
HDLC SERPL-MCNC: 63 MG/DL (ref 40–60)
LDLC SERPL CALC-MCNC: 93 MG/DL (ref 0–100)
LDLC/HDLC SERPL: 1.44 {RATIO}
POTASSIUM SERPL-SCNC: 4.5 MMOL/L (ref 3.5–5.2)
PROT SERPL-MCNC: 5.9 G/DL (ref 6–8.5)
SODIUM SERPL-SCNC: 141 MMOL/L (ref 136–145)
T-UPTAKE NFR SERPL: 1.13 TBI (ref 0.8–1.3)
T4 SERPL-MCNC: 5.43 MCG/DL (ref 4.5–11.7)
TRIGL SERPL-MCNC: 95 MG/DL (ref 0–150)
TSH SERPL DL<=0.05 MIU/L-ACNC: 4.74 UIU/ML (ref 0.27–4.2)
VLDLC SERPL-MCNC: 17 MG/DL (ref 5–40)

## 2021-09-22 DIAGNOSIS — Z00.6 EXAMINATION FOR NORMAL COMPARISON OR CONTROL IN CLINICAL RESEARCH: Primary | ICD-10-CM

## 2021-09-23 ENCOUNTER — OFFICE VISIT (OUTPATIENT)
Dept: PULMONOLOGY | Facility: CLINIC | Age: 51
End: 2021-09-23

## 2021-09-23 VITALS
SYSTOLIC BLOOD PRESSURE: 132 MMHG | TEMPERATURE: 97.3 F | BODY MASS INDEX: 27.85 KG/M2 | WEIGHT: 188 LBS | RESPIRATION RATE: 16 BRPM | HEART RATE: 54 BPM | HEIGHT: 69 IN | OXYGEN SATURATION: 98 % | DIASTOLIC BLOOD PRESSURE: 98 MMHG

## 2021-09-23 DIAGNOSIS — R04.2 HEMOPTYSIS: Primary | ICD-10-CM

## 2021-09-23 DIAGNOSIS — R91.1 PULMONARY NODULE: ICD-10-CM

## 2021-09-23 DIAGNOSIS — Z78.9 NON-SMOKER: ICD-10-CM

## 2021-09-23 PROCEDURE — 99205 OFFICE O/P NEW HI 60 MIN: CPT | Performed by: INTERNAL MEDICINE

## 2021-09-23 RX ORDER — BETAMETHASONE DIPROPIONATE 0.5 MG/G
CREAM TOPICAL
COMMUNITY
Start: 2021-08-23 | End: 2022-08-29

## 2021-09-23 NOTE — PROGRESS NOTES
"  PULMONARY  NOTE    Chief Complaint     Hemoptysis, pulmonary nodule, non-smoker    History of Present Illness     51-year-old male referred for evaluation of hemoptysis and abnormal CT scan of the chest    He is essentially a non-smoker  He smoked a little bit but not regularly when he was in college  He has had secondhand exposure to smoke as a child    He has no prior history of known lung disease    He runs regularly between 4 and 8 miles.  He has been having some knee issues and that has been limiting his running but otherwise he has not noted any respiratory limitation to exercise    About 2 weeks ago he was running and coughed and produced bright red blood.  Not really any mucus mixed with the blood, just bright red blood.  He felt a \"bubbling\" in his chest and again coughed and expectorated bright red blood.  Despite that, he was able to finish his 4 mile run and had no further hemoptysis.  Is now been about 2 weeks and he has had no further episodes of hemoptysis.  He has had no epistaxis.  No hematemesis or bright red blood per rectum.  He is noted no hematuria.    He has no history of autoimmune disease.  He did have an eczema type rash on his pretibial area on his left lower extremity that has improved with a steroid cream.    He is noted no weight loss    He is on aspirin 81 mg but on no anticoagulants.    At the time of his hemoptysis he was traveling out of town when he returned he underwent a CT scan of the chest in [place].  That CT report is as noted below  He has had no previous chest imaging for comparison, unfortunately.    He has no history of deep venous thrombosis or thromboembolic disease    Patient Active Problem List   Diagnosis   • NSTEMI (non-ST elevated myocardial infarction) (CMS/[facility])   • Palpitations   • Near syncope   • Bradycardia, sinus   • Non-smoker   • Hemoptysis   • Incidental 10mm KAREN pulmonary nodule     Allergies   Allergen Reactions   • Penicillins Rash       Current " "Outpatient Medications:   •  aspirin  MG tablet, Take 81 mg by mouth Daily., Disp: , Rfl:   •  atorvastatin (LIPITOR) 80 MG tablet, TAKE ONE TABLET BY MOUTH EVERY NIGHT AT BEDTIME, Disp: 30 tablet, Rfl: 5  •  betamethasone dipropionate 0.05 % cream, , Disp: , Rfl:   •  DHA-EPA-Vitamin E (OMEGA-3 COMPLEX PO), Take  by mouth., Disp: , Rfl:   •  fluocinonide-emollient (LIDEX-E) 0.05 % cream, Apply  topically to the appropriate area as directed 2 (Two) Times a Day., Disp: 30 g, Rfl: 1  •  levothyroxine (SYNTHROID, LEVOTHROID) 25 MCG tablet, TAKE ONE TABLET BY MOUTH DAILY, Disp: 30 tablet, Rfl: 11  •  Multiple Vitamins-Minerals (MULTIVITAMIN ADULT PO), Take 1 tablet by mouth Daily., Disp: , Rfl:   •  vitamin B-12 (CYANOCOBALAMIN) 1000 MCG tablet, Take 1,000 mcg by mouth Daily., Disp: , Rfl:   •  finasteride (PROSCAR) 5 MG tablet, Take 1 tablet by mouth Daily., Disp: 30 tablet, Rfl: 4  MEDICATION LIST AND ALLERGIES REVIEWED.    Family History   Problem Relation Age of Onset   • Cancer Mother    • Cancer Father      Social History     Tobacco Use   • Smoking status: Never Smoker   • Smokeless tobacco: Never Used   Vaping Use   • Vaping Use: Never used   Substance Use Topics   • Alcohol use: Yes     Alcohol/week: 2.0 standard drinks     Types: 2 Cans of beer per week   • Drug use: No     Social History     Social History Narrative    Essentially non-smoker.  Smoked occasionally when in college but not regularly    Has had significant secondhand smoke exposure as a child    Went to school in an institution that had asbestos coated pipes but never worked with a specialist    No history of tuberculosis     FAMILY AND SOCIAL HISTORY REVIEWED.    Review of Systems  ALSO REFER TO SCANNED ROS SHEET FROM SAME DATE.    /98 (BP Location: Right arm, Patient Position: Sitting, Cuff Size: Adult)   Pulse 54   Temp 97.3 °F (36.3 °C)   Resp 16   Ht 175.3 cm (69.02\")   Wt 85.3 kg (188 lb)   SpO2 98% Comment: room air at " rest  BMI 27.75 kg/m²   Physical Exam  Vitals and nursing note reviewed.   Constitutional:       General: He is not in acute distress.     Appearance: He is well-developed. He is not diaphoretic.   HENT:      Head: Normocephalic and atraumatic.   Neck:      Thyroid: No thyromegaly.   Cardiovascular:      Rate and Rhythm: Normal rate and regular rhythm.      Heart sounds: Normal heart sounds. No murmur heard.     Pulmonary:      Effort: Pulmonary effort is normal.      Breath sounds: Normal breath sounds. No stridor.   Musculoskeletal:      Right lower leg: No edema.      Left lower leg: No edema.   Lymphadenopathy:      Cervical: No cervical adenopathy.      Upper Body:      Right upper body: No supraclavicular or epitrochlear adenopathy.      Left upper body: No supraclavicular or epitrochlear adenopathy.   Skin:     General: Skin is warm and dry.   Neurological:      Mental Status: He is alert and oriented to person, place, and time.   Psychiatric:         Behavior: Behavior normal.         Results     CT scan of the chest done at [facility] from 9/17/2021 reviewed on PACS.  This was a contrast CT scan.  I also reviewed this scan with radiologist at [facility].  There is a well-circumscribed approximately 1 cm left upper lobe pulmonary nodule that is along the fissure.  This is a contrast study and this does not appear to be an AVM although that cannot entirely be excluded based on this technique  No other suspicious intrathoracic abnormalities were identified    Immunization History   Administered Date(s) Administered   • COVID-19 (PFIZER) 02/01/2021, 02/22/2021   • Flu Vaccine Quad PF >18YRS 11/01/2018   • Hepatitis A 11/01/2018     Problem List       ICD-10-CM ICD-9-CM   1. Hemoptysis  R04.2 786.30   2. Incidental 10mm KAREN pulmonary nodule  R91.1 793.11   3. Non-smoker  Z78.9 V49.89       Discussion     We had an extended office visit.  His brother accompanied him to the office today.  I provided him with some  "literature on pulmonary nodule management.    He has 2 issues which may, or may not, be related.  He has had one episode of naomi hemoptysis, submassive.  He is not had an episode since.  The differential diagnosis is quite wide.  He has nothing to suggest a pulmonary hemorrhage syndrome although that cannot be excluded.  Nothing on exam or CT to suggest a cardiac etiology and had a relatively negative cardiac work-up several years ago.  It is unlikely that he would have developed pulmonary hypertension or significant valvular heart disease over this several years.  It is interesting to note, however, that his LVEDP was elevated on his LHC at 17 mmHg.  We discussed the work-up of pulmonary hemorrhage at the very least including lab work-up.    We discussed his pulmonary nodule in detail.  Again, this is probably not an AVM but that still is in the differential  Based on Fleischner Society guidelines for this incidentally identified 10 mm pulmonary nodule PET imaging versus short interval CT scan would be a good consideration. I do not think that this lesion is amenable to CT FNA.  We discussed pulmonary nodule management up to the point of surgical resection  We discussed the risks and benefits of surgery and needle biopsy, which could include bleeding  We discussed potential lung \"tumors\" including carcinomas, carcinoid, etc.    Regarding the hemoptysis, we are going to defer further work-up as he has had no further episodes in 2 weeks.    Regarding lung nodule management, we primarily focused on PET CT now versus 3-month short interval CT scan of the chest  He and his brother want to think about it some more and get back to me  We discussed the risks and benefits of both of these approaches  If we did choose to wait 3 months and he developed recurrent hemoptysis, obviously we would accelerate that work-up process.    Next steps will be based on them calling back with a decision.    Level of service justified based " on 64 minutes spent in patient care on this date of service including, but not limited to: preparing to see the patient, obtaining and/or reviewing history, performing medically appropriate examination, ordering tests/medicine/procedures, independently interpreting results, documenting clinical information in EHR, and counseling/education of patient/family/caregiver (excluding time spent on other separate services such as performing procedures or test interpretation, if applicable). (Level 4 45-59 minutes; Level 5 60-74 minutes)    Carlos Hernandez MD  Note electronically signed    CC: Jl Watson PA

## 2021-10-05 ENCOUNTER — TELEPHONE (OUTPATIENT)
Dept: PULMONOLOGY | Facility: CLINIC | Age: 51
End: 2021-10-05

## 2021-10-05 DIAGNOSIS — R91.1 PULMONARY NODULE: Primary | ICD-10-CM

## 2021-10-05 NOTE — TELEPHONE ENCOUNTER
Pt called today stating that Dr Carlos Hernandez advised him to contact the office once a decision has been mad regarding a PET Scan. Pt states that he would like to move forward and go ahead and get this scheduled through [facility] [place]. Pt can be reached @ [phone].

## 2021-10-12 ENCOUNTER — TRANSCRIBE ORDERS (OUTPATIENT)
Dept: PULMONOLOGY | Facility: CLINIC | Age: 51
End: 2021-10-12

## 2021-10-22 ENCOUNTER — APPOINTMENT (OUTPATIENT)
Dept: PET IMAGING | Facility: HOSPITAL | Age: 51
End: 2021-10-22

## 2021-10-25 ENCOUNTER — TELEPHONE (OUTPATIENT)
Dept: PULMONOLOGY | Facility: CLINIC | Age: 51
End: 2021-10-25

## 2021-10-25 DIAGNOSIS — R91.1 PULMONARY NODULE: ICD-10-CM

## 2021-10-28 DIAGNOSIS — Z00.6 EXAMINATION FOR NORMAL COMPARISON OR CONTROL IN CLINICAL RESEARCH: Primary | ICD-10-CM

## 2021-12-14 ENCOUNTER — OFFICE VISIT (OUTPATIENT)
Dept: PULMONOLOGY | Facility: CLINIC | Age: 51
End: 2021-12-14

## 2021-12-14 VITALS
RESPIRATION RATE: 16 BRPM | WEIGHT: 196.25 LBS | SYSTOLIC BLOOD PRESSURE: 126 MMHG | TEMPERATURE: 98.2 F | OXYGEN SATURATION: 97 % | HEIGHT: 69 IN | DIASTOLIC BLOOD PRESSURE: 78 MMHG | BODY MASS INDEX: 29.07 KG/M2 | HEART RATE: 69 BPM

## 2021-12-14 DIAGNOSIS — Z78.9 NON-SMOKER: ICD-10-CM

## 2021-12-14 DIAGNOSIS — R91.1 PULMONARY NODULE: ICD-10-CM

## 2021-12-14 DIAGNOSIS — R04.2 HEMOPTYSIS: Primary | ICD-10-CM

## 2021-12-14 PROCEDURE — 99213 OFFICE O/P EST LOW 20 MIN: CPT | Performed by: INTERNAL MEDICINE

## 2021-12-14 RX ORDER — TRAMADOL HYDROCHLORIDE 50 MG/1
50 TABLET ORAL EVERY 6 HOURS PRN
COMMUNITY
Start: 2021-12-10 | End: 2022-08-29

## 2021-12-14 RX ORDER — CYCLOBENZAPRINE HCL 10 MG
10 TABLET ORAL 2 TIMES DAILY PRN
COMMUNITY
Start: 2021-12-10 | End: 2022-08-29

## 2021-12-14 NOTE — PROGRESS NOTES
"  PULMONARY  NOTE    Chief Complaint     Hemoptysis, pulmonary nodule, non-smoker    History of Present Illness     51-year-old male returns today for follow-up  I initially saw him on 9/23/2021    He is essentially a non-smoker with no prior history of known lung disease    He is active and regularly runs between 4 and 8 miles    About 2 weeks prior to when I saw him in September he had been running and coughed up blood and produced some bright red blood.  He felt some \"bubbling\" in his chest at that time  Despite that he was able to finish his 4 mile run and had no further hemoptysis  He has had no further hemoptysis since that episode    He underwent a CT scan of the chest in September 17, 2021  This revealed a approximately 10 mm left lung nodule  He had no prior chest imaging    We discussed work-up at that appointment and eventually elected to pursue a PET CT scan.  This was performed at [facility] on 10/22/2021  It revealed no abnormal hypermetabolic activity in the previously noted pulmonary nodule was no longer visible.    Patient Active Problem List   Diagnosis   • NSTEMI (non-ST elevated myocardial infarction) (HCC)   • Palpitations   • Near syncope   • Bradycardia, sinus   • Non-smoker   • Hemoptysis   • Incidental 10mm KAREN pulmonary nodule (Resolved on FU scan)     Allergies   Allergen Reactions   • Penicillins Rash       Current Outpatient Medications:   •  aspirin  MG tablet, Take 81 mg by mouth Daily., Disp: , Rfl:   •  atorvastatin (LIPITOR) 80 MG tablet, TAKE ONE TABLET BY MOUTH EVERY NIGHT AT BEDTIME, Disp: 30 tablet, Rfl: 5  •  betamethasone dipropionate 0.05 % cream, , Disp: , Rfl:   •  cyclobenzaprine (FLEXERIL) 10 MG tablet, Take 10 mg by mouth 2 (Two) Times a Day As Needed., Disp: , Rfl:   •  DHA-EPA-Vitamin E (OMEGA-3 COMPLEX PO), Take  by mouth., Disp: , Rfl:   •  finasteride (PROSCAR) 5 MG tablet, Take 1 tablet by mouth Daily., Disp: 30 tablet, Rfl: 4  •  fluocinonide-emollient (LIDEX-E) " "0.05 % cream, Apply  topically to the appropriate area as directed 2 (Two) Times a Day., Disp: 30 g, Rfl: 1  •  levothyroxine (SYNTHROID, LEVOTHROID) 25 MCG tablet, TAKE ONE TABLET BY MOUTH DAILY, Disp: 30 tablet, Rfl: 11  •  Multiple Vitamins-Minerals (MULTIVITAMIN ADULT PO), Take 1 tablet by mouth Daily., Disp: , Rfl:   •  traMADol (ULTRAM) 50 MG tablet, Take 50 mg by mouth Every 6 (Six) Hours As Needed., Disp: , Rfl:   •  vitamin B-12 (CYANOCOBALAMIN) 1000 MCG tablet, Take 1,000 mcg by mouth Daily., Disp: , Rfl:   MEDICATION LIST AND ALLERGIES REVIEWED.    Family History   Problem Relation Age of Onset   • Cancer Mother    • Cancer Father      Social History     Tobacco Use   • Smoking status: Never Smoker   • Smokeless tobacco: Never Used   Vaping Use   • Vaping Use: Never used   Substance Use Topics   • Alcohol use: Yes     Alcohol/week: 2.0 standard drinks     Types: 2 Cans of beer per week   • Drug use: No     Social History     Social History Narrative    Essentially non-smoker.  Smoked occasionally when in college but not regularly    Has had significant secondhand smoke exposure as a child    Went to school in an institution that had asbestos coated pipes but never worked with a specialist    No history of tuberculosis     FAMILY AND SOCIAL HISTORY REVIEWED.    Review of Systems  ALSO REFER TO SCANNED ROS SHEET FROM SAME DATE.    /78 (BP Location: Left arm, Patient Position: Sitting, Cuff Size: Adult)   Pulse 69   Temp 98.2 °F (36.8 °C)   Resp 16   Ht 175.3 cm (69.02\")   Wt 89 kg (196 lb 4 oz)   SpO2 97% Comment: room air at rest  BMI 28.96 kg/m²   Physical Exam  Vitals and nursing note reviewed.   Constitutional:       General: He is not in acute distress.     Appearance: He is well-developed. He is not diaphoretic.   Neck:      Thyroid: No thyromegaly.   Cardiovascular:      Heart sounds: No murmur heard.      Pulmonary:      Effort: Pulmonary effort is normal.      Breath sounds: No " stridor.   Chest:   Breasts:      Right: No supraclavicular adenopathy.      Left: No supraclavicular adenopathy.       Lymphadenopathy:      Cervical: No cervical adenopathy.      Upper Body:      Right upper body: No supraclavicular or epitrochlear adenopathy.      Left upper body: No supraclavicular or epitrochlear adenopathy.   Neurological:      Mental Status: He is alert and oriented to person, place, and time.   Psychiatric:         Behavior: Behavior normal.         Results     PET CT scan done at the [facility] on 10/22/2021 reviewed on PACS.  The previously noted approximately 10 mm left-sided pulmonary nodule has resolved.  There are few pulmonary nodules all appear less than 5 mm in size.  No abnormal hypermetabolic activity    Immunization History   Administered Date(s) Administered   • COVID-19 (PFIZER) 02/01/2021, 02/22/2021, 09/01/2021   • Flu Vaccine Quad PF >18YRS 11/01/2018   • Hepatitis A 11/01/2018     Problem List       ICD-10-CM ICD-9-CM   1. Hemoptysis  R04.2 786.30   2. Incidental 10mm KAREN pulmonary nodule (Resolved on FU scan)  R91.1 793.11   3. Non-smoker  Z78.9 V49.89       Discussion     We reviewed his chest imaging  His wife accompanied him to the office today    He has had no further episodes of hemoptysis and has no pulmonary symptoms.    At this point, I am not sure why he had hemoptysis.  There does not appear to be an underlying vascular structure such as an AVM.  He has had no recurrence  I suspect that that pulmonary nodule was localized area of bleeding or inflammation.  He has no symptoms to suggest a pulmonary hemorrhage syndrome.    At this point would not pursue any further work-up.  He will call back if he develops persistent respiratory symptoms or any further hemoptysis    Carlos Hernandez MD  Note electronically signed    CC: Jl Watson PA

## 2022-01-05 DIAGNOSIS — E78.5 HYPERLIPIDEMIA, UNSPECIFIED HYPERLIPIDEMIA TYPE: ICD-10-CM

## 2022-01-05 RX ORDER — ATORVASTATIN CALCIUM 80 MG/1
TABLET, FILM COATED ORAL
Qty: 30 TABLET | Refills: 5 | Status: SHIPPED | OUTPATIENT
Start: 2022-01-05 | End: 2022-07-05

## 2022-04-04 RX ORDER — LEVOTHYROXINE SODIUM 0.03 MG/1
TABLET ORAL
Qty: 30 TABLET | Refills: 11 | Status: SHIPPED | OUTPATIENT
Start: 2022-04-04

## 2022-07-03 DIAGNOSIS — E78.5 HYPERLIPIDEMIA, UNSPECIFIED HYPERLIPIDEMIA TYPE: ICD-10-CM

## 2022-07-05 RX ORDER — ATORVASTATIN CALCIUM 80 MG/1
TABLET, FILM COATED ORAL
Qty: 30 TABLET | Refills: 0 | Status: SHIPPED | OUTPATIENT
Start: 2022-07-05 | End: 2022-08-15

## 2022-08-13 DIAGNOSIS — E78.5 HYPERLIPIDEMIA, UNSPECIFIED HYPERLIPIDEMIA TYPE: ICD-10-CM

## 2022-08-15 RX ORDER — ATORVASTATIN CALCIUM 80 MG/1
TABLET, FILM COATED ORAL
Qty: 30 TABLET | Refills: 5 | Status: SHIPPED | OUTPATIENT
Start: 2022-08-15

## 2022-08-23 ENCOUNTER — TELEPHONE (OUTPATIENT)
Dept: CARDIOLOGY | Facility: CLINIC | Age: 52
End: 2022-08-23

## 2022-08-29 ENCOUNTER — OFFICE VISIT (OUTPATIENT)
Dept: CARDIOLOGY | Facility: CLINIC | Age: 52
End: 2022-08-29

## 2022-08-29 ENCOUNTER — TELEPHONE (OUTPATIENT)
Dept: CARDIOLOGY | Facility: CLINIC | Age: 52
End: 2022-08-29

## 2022-08-29 VITALS
OXYGEN SATURATION: 97 % | HEIGHT: 70 IN | SYSTOLIC BLOOD PRESSURE: 116 MMHG | WEIGHT: 187.8 LBS | HEART RATE: 52 BPM | BODY MASS INDEX: 26.88 KG/M2 | DIASTOLIC BLOOD PRESSURE: 80 MMHG

## 2022-08-29 DIAGNOSIS — G47.33 OSA (OBSTRUCTIVE SLEEP APNEA): Primary | ICD-10-CM

## 2022-08-29 PROCEDURE — 99214 OFFICE O/P EST MOD 30 MIN: CPT | Performed by: PHYSICIAN ASSISTANT

## 2022-08-29 NOTE — PROGRESS NOTES
[facility]   OFFICE NOTE      Branden Aguayo  1970  PCP: Jose Manuel Harper MD    SUBJECTIVE:   Branden Aguayo is a 52 y.o. male seen for a follow up visit regarding the following:     CC: Vasovagal syncope    HPI:   52-year-old gentleman presents today for follow-up regarding vasovagal symptoms, palpitations dyslipidemia.  He reports that he is back in the running is running 5 to 6 miles a day feeling pretty good while doing this.  He has noticed occasionally when resting at night or after getting ready for sleep he will have some increased episodes of palpitations noted to take his breath.  He does not feel these are any different from palpitation experience while wearing the monitor 2 years ago revealed occasional PACs and PVCs and A. tach.  He has not any sustained runs of fast heart rate.  He has not any recurrent episodes of syncope.  He had lab with the PCP which was stable with he reports normal thyroid levels and acceptable cholesterol level.  Overall he feels doing well with exception of his increased palpitations recently.  He does admit today that he has more snoring and would like to consider a sleep study.    Cardiac PMH: (Old records have been reviewed and summarized below)  1. Near syncope  a. Positive tronponin 0.59, 1.4.    b. Bradycardia, Stable  c. LHC 11/15/18, Nonobstructive CAD, Normal EF.   d. Echocardiogram, Aortic valve calcification, Normal EF  2. Palpitations  a. ZioPatch 4/2020: occasional PACs 1%, short run of AT 4 beats, average HR 63 bpm. Less than 1% PVCs.   3. HLD:  Statin.   4. Asymptomatic Bradycardia  5. Hypothyroidism        Past Medical History, Past Surgical History, Family history, Social History, and Medications were all reviewed with the patient today and updated as necessary.       Current Outpatient Medications:   •  aspirin  MG tablet, Take 81 mg by mouth Daily., Disp: , Rfl:   •  atorvastatin (LIPITOR) 80 MG tablet, TAKE  "ONE TABLET BY MOUTH EVERY NIGHT AT BEDTIME, Disp: 30 tablet, Rfl: 5  •  DHA-EPA-Vitamin E (OMEGA-3 COMPLEX PO), Take  by mouth., Disp: , Rfl:   •  levothyroxine (SYNTHROID, LEVOTHROID) 25 MCG tablet, TAKE ONE TABLET BY MOUTH DAILY, Disp: 30 tablet, Rfl: 11  •  Multiple Vitamins-Minerals (MULTIVITAMIN ADULT PO), Take 1 tablet by mouth Daily., Disp: , Rfl:       Allergies   Allergen Reactions   • Penicillins Rash     Patient Active Problem List   Diagnosis   • NSTEMI (non-ST elevated myocardial infarction) (HCC)   • Palpitations   • Near syncope   • Bradycardia, sinus   • Non-smoker   • Hemoptysis   • Incidental 10mm KAREN pulmonary nodule (Resolved on FU scan)     Past Medical History:   Diagnosis Date   • Bradycardia    • Hyperlipidemia    • Hyperthyroidism    • NSTEMI (non-ST elevated myocardial infarction) (HCC)    • Palpitations      Past Surgical History:   Procedure Laterality Date   • CARDIAC CATHETERIZATION N/A 11/15/2018    Procedure: Left Heart Cath;  Surgeon: Igor Castañeda MD;  Location: [facility] CATH INVASIVE LOCATION;  Service: Cardiology   • COLONOSCOPY     • NOSE SURGERY      15 y/o-injury   • VASECTOMY  2011   • WISDOM TOOTH EXTRACTION       Family History   Problem Relation Age of Onset   • Cancer Mother    • Cancer Father      Social History     Tobacco Use   • Smoking status: Never Smoker   • Smokeless tobacco: Never Used   Substance Use Topics   • Alcohol use: Yes     Alcohol/week: 2.0 standard drinks     Types: 2 Cans of beer per week       PHYSICAL EXAM:    /80 (BP Location: Right arm, Patient Position: Sitting)   Pulse 52   Ht 176.5 cm (69.5\")   Wt 85.2 kg (187 lb 12.8 oz)   SpO2 97%   BMI 27.34 kg/m²        Wt Readings from Last 5 Encounters:   08/29/22 85.2 kg (187 lb 12.8 oz)   12/14/21 89 kg (196 lb 4 oz)   09/23/21 85.3 kg (188 lb)   07/30/21 83.9 kg (185 lb)   11/24/20 84.7 kg (186 lb 12.8 oz)       BP Readings from Last 5 Encounters:   08/29/22 116/80   12/14/21 126/78   09/23/21 " 132/98   07/30/21 100/73   11/24/20 118/82       General appearance - Alert, well appearing, and in no distress   Mental status - Affect appropriate to mood.  Eyes - Sclerae anicteric,  ENMT - Hearing grossly normal bilaterally, Dental hygiene good.  Neck - Carotids upstroke normal bilaterally, no bruits, no JVD.  Resp - Clear to auscultation, no wheezes, rales or rhonchi, symmetric air entry.  Heart - Normal rate, regular rhythm, normal S1, S2, no murmurs, rubs, clicks or gallops.  GI - Soft, nontender, nondistended, no masses or organomegaly.  Neurological - Grossly intact - normal speech, no focal findings  Musculoskeletal - No joint tenderness, deformity or swelling, no muscular tenderness noted.  Extremities - Peripheral pulses normal, no pedal edema, no clubbing or cyanosis.  Skin - Normal coloration and turgor.  Psych -  oriented to person, place, and time.    Medical problems and test results were reviewed with the patient today.     No results found for this or any previous visit (from the past 672 hour(s)).        ASSESSMENT   1. Palpitations: We will continue to monitor as previously these were PACs PVCs of these increase in nature we will pursue another monitor.  2. VVS: no recurrent events. Marginal SBP.   3.  Asymptomatic bradycardia  4. Nonobstructive CAD by [facility] 11/15/18-On Asa, Statin. No angina symptoms  5.  Echocardiogram clacification of aortic valve, No AS.Normal EF.   6.  Dyslipidemia: Continue statin therapy  7.  Possible sleep apnea symptoms will could exacerbate palpitations, daytime somnolence    PLAN  · Sleep study  · Continue current medical therapy  · Consider follow-up echocardiogram in the future next year or if any change in symptoms  · Follow-up in 1 year or sooner as needed  8/29/2022  14:13 EDT    Will Yani STARKEY

## 2022-09-07 ENCOUNTER — OFFICE VISIT (OUTPATIENT)
Dept: SLEEP MEDICINE | Facility: HOSPITAL | Age: 52
End: 2022-09-07

## 2022-09-07 VITALS
BODY MASS INDEX: 26.92 KG/M2 | WEIGHT: 188 LBS | HEART RATE: 47 BPM | RESPIRATION RATE: 18 BRPM | HEIGHT: 70 IN | DIASTOLIC BLOOD PRESSURE: 70 MMHG | SYSTOLIC BLOOD PRESSURE: 110 MMHG

## 2022-09-07 DIAGNOSIS — G47.33 OSA (OBSTRUCTIVE SLEEP APNEA): ICD-10-CM

## 2022-09-07 DIAGNOSIS — R00.2 PALPITATIONS: Primary | ICD-10-CM

## 2022-09-07 DIAGNOSIS — R06.83 SNORING: ICD-10-CM

## 2022-09-07 DIAGNOSIS — G47.19 EXCESSIVE DAYTIME SLEEPINESS: ICD-10-CM

## 2022-09-07 PROBLEM — E03.9 HYPOTHYROIDISM: Status: ACTIVE | Noted: 2022-09-07

## 2022-09-07 PROCEDURE — 99214 OFFICE O/P EST MOD 30 MIN: CPT | Performed by: INTERNAL MEDICINE

## 2022-09-07 NOTE — PROGRESS NOTES
Branden Aguayo is a 52 y.o. male.   Chief Complaint   Patient presents with   • Sleeping Problem       HPI     52 y.o. male seen in consultation at the request of Frankie Lomeli PA for evaluation of the above.     He has a history of low-grade PACs and PVCs and is followed by [facility].  He also has a history of near syncope in 2018 and has had a negative cardiac work-up.    At the time of his last visit to see Mr. Lomeli a history of snoring and potential daytime somnolence was elicited.    His wife notes snoring at night but does not note any apneas.    From his standpoint he awakens on average of 2 times per night.  He has a regular sleep schedule going to sleep and 10 minutes and averaging about 9 hours of sleep.  He will often nap during the day but he works from home and this is a habit of his.  On close questioning he does admit to some daytime somnolence.    He denies any dry mouth or sore throat in the morning.  He has no cataplexy or nocturnal hallucinations or sleep paralysis.  He has no RLS type symptoms.    Starks Scale is: 6/24    The patient's relevant past medical, surgical, family, and social history reviewed and updated in Epic as appropriate.    Current medications are:   Current Outpatient Medications:   •  aspirin  MG tablet, Take 81 mg by mouth Daily., Disp: , Rfl:   •  atorvastatin (LIPITOR) 80 MG tablet, TAKE ONE TABLET BY MOUTH EVERY NIGHT AT BEDTIME, Disp: 30 tablet, Rfl: 5  •  levothyroxine (SYNTHROID, LEVOTHROID) 25 MCG tablet, TAKE ONE TABLET BY MOUTH DAILY, Disp: 30 tablet, Rfl: 11  •  Multiple Vitamins-Minerals (MULTIVITAMIN ADULT PO), Take 1 tablet by mouth Daily., Disp: , Rfl:   •  DHA-EPA-Vitamin E (OMEGA-3 COMPLEX PO), Take  by mouth., Disp: , Rfl: .    Review of Systems    Review of Systems  ROS documented in patient questionnaire ×14 systems.  Reviewed with patient.  Otherwise negative except as noted in HPI.    Physical Exam    Blood pressure 110/70, pulse  "(!) 47, resp. rate 18, height 177.8 cm (70\"), weight 85.3 kg (188 lb). Body mass index is 26.98 kg/m².    Physical Exam  Vitals and nursing note reviewed.   Constitutional:       Appearance: Normal appearance. He is well-developed.   HENT:      Head: Normocephalic and atraumatic.      Nose: Nose normal.      Mouth/Throat:      Mouth: Mucous membranes are moist.      Pharynx: Oropharynx is clear. No oropharyngeal exudate.      Comments: Class IV airway  Eyes:      General: No scleral icterus.     Conjunctiva/sclera: Conjunctivae normal.   Neck:      Thyroid: No thyromegaly.      Trachea: No tracheal deviation.   Cardiovascular:      Rate and Rhythm: Normal rate and regular rhythm.      Heart sounds: No murmur heard.    No friction rub. No gallop.   Pulmonary:      Effort: Pulmonary effort is normal. No respiratory distress.      Breath sounds: No wheezing or rales.   Musculoskeletal:         General: No deformity. Normal range of motion.   Skin:     General: Skin is warm and dry.      Findings: No rash.   Neurological:      Mental Status: He is alert and oriented to person, place, and time.   Psychiatric:         Behavior: Behavior normal.         Thought Content: Thought content normal.         DATA:    Reviewed 8/29/2022 note from [facility]NA[facility]    Reviewed laboratory studies dated 8/10 which reveals bicarbonate 30 and TSH 4.7    ASSESSMENT:    Problem List Items Addressed This Visit        Pulmonary Problems    GAIL (obstructive sleep apnea) - possible    Relevant Orders    Home Sleep Study    Snoring    Relevant Orders    Home Sleep Study       Other    Excessive daytime sleepiness    Relevant Orders    Home Sleep Study    Palpitations - Primary          52-year-old male with snoring and the possibility of excessive daytime somnolence.  He does nap frequently.  He has a class IV airway.  He is at high risk for GAIL based upon STOP-BANG criteria.  An HSAT is appropriate in his case.    PLAN:    1. Home sleep apnea " testing as described above  2. I discussed the diagnostic process as well as treatment options for GAIL  3. I went over the potential relationship between untreated and undiagnosed GAIL and cardiac arrhythmias as well as long-term effects of untreated GAIL from a cardiovascular and metabolic standpoint  4. He seemed amenable to a trial of CPAP therapy if deemed appropriate after his HSAT  5. Continued sleep center follow-up    I have reviewed the results of my evaluation and impression and discussed my recommendations in detail with the patient.    Level of Risk Moderate due to: undiagnosed new problem     Moderate risk of morbidity due to the possibility of untreated sleep apnea.      Signed by  Fortunato Santiago MD    September 7, 2022      CC: Jose Manuel Harper MD Sudd[place], William V, PA

## 2022-11-15 ENCOUNTER — TELEPHONE (OUTPATIENT)
Dept: SLEEP MEDICINE | Facility: HOSPITAL | Age: 52
End: 2022-11-15

## 2022-11-15 ENCOUNTER — OFFICE VISIT (OUTPATIENT)
Dept: PULMONOLOGY | Facility: CLINIC | Age: 52
End: 2022-11-15

## 2022-11-15 VITALS
SYSTOLIC BLOOD PRESSURE: 108 MMHG | BODY MASS INDEX: 26.92 KG/M2 | RESPIRATION RATE: 16 BRPM | TEMPERATURE: 98.2 F | WEIGHT: 188 LBS | HEART RATE: 56 BPM | OXYGEN SATURATION: 99 % | HEIGHT: 70 IN | DIASTOLIC BLOOD PRESSURE: 70 MMHG

## 2022-11-15 DIAGNOSIS — R91.1 PULMONARY NODULE: ICD-10-CM

## 2022-11-15 DIAGNOSIS — G47.33 OSA (OBSTRUCTIVE SLEEP APNEA): Primary | ICD-10-CM

## 2022-11-15 DIAGNOSIS — R04.2 HEMOPTYSIS: ICD-10-CM

## 2022-11-15 DIAGNOSIS — Z78.9 NON-SMOKER: ICD-10-CM

## 2022-11-15 PROCEDURE — 86037 ANCA TITER EACH ANTIBODY: CPT | Performed by: INTERNAL MEDICINE

## 2022-11-15 PROCEDURE — 86235 NUCLEAR ANTIGEN ANTIBODY: CPT | Performed by: INTERNAL MEDICINE

## 2022-11-15 PROCEDURE — 84153 ASSAY OF PSA TOTAL: CPT | Performed by: INTERNAL MEDICINE

## 2022-11-15 PROCEDURE — 85025 COMPLETE CBC W/AUTO DIFF WBC: CPT | Performed by: INTERNAL MEDICINE

## 2022-11-15 PROCEDURE — 83516 IMMUNOASSAY NONANTIBODY: CPT | Performed by: INTERNAL MEDICINE

## 2022-11-15 PROCEDURE — 99214 OFFICE O/P EST MOD 30 MIN: CPT | Performed by: INTERNAL MEDICINE

## 2022-11-15 PROCEDURE — 86225 DNA ANTIBODY NATIVE: CPT | Performed by: INTERNAL MEDICINE

## 2022-11-15 PROCEDURE — 80053 COMPREHEN METABOLIC PANEL: CPT | Performed by: INTERNAL MEDICINE

## 2022-11-15 NOTE — PROGRESS NOTES
PULMONARY  NOTE    Chief Complaint     Recurrent hemoptysis, non-smoker    History of Present Illness     52-year-old male returns today for follow-up  I last saw him 12/14/2021    He was originally seen about a year ago after having an episode of hemoptysis  At the time it occurred he had been on a 4 mile run    Initial CT scan revealed a 10 mm left lung nodule  A follow-up CT scan of the chest revealed resolution of the previously noted pulmonary nodule with no intrathoracic abnormalities    Our plan at that time was just to follow-up if he had any recurrent symptoms    He did well until about a week ago  Again while running he felt the urge to cough and coughed up some blood-streaked sputum  It was a moderate amount of blood mixed with a small amount of sputum and not as voluminous as last year  He coughed up blood 3 times throughout the day and has had no hemoptysis since that time    Overall however he feels about the same  No change in exercise limitation    He does have palpitation issues which have not changed    He has symptoms suggestive of obstructive sleep apnea and a home sleep study has been scheduled but not yet performed    Patient Active Problem List   Diagnosis   • NSTEMI (non-ST elevated myocardial infarction) (HCC)   • Palpitations   • Near syncope   • Bradycardia, sinus   • Non-smoker   • Hemoptysis   • Incidental 10mm KAREN pulmonary nodule (Resolved on FU scan)   • Hypothyroidism   • GAIL (obstructive sleep apnea) - possible   • Snoring   • Excessive daytime sleepiness     Allergies   Allergen Reactions   • Penicillins Rash       Current Outpatient Medications:   •  aspirin  MG tablet, Take 81 mg by mouth Daily., Disp: , Rfl:   •  atorvastatin (LIPITOR) 80 MG tablet, TAKE ONE TABLET BY MOUTH EVERY NIGHT AT BEDTIME, Disp: 30 tablet, Rfl: 5  •  DHA-EPA-Vitamin E (OMEGA-3 COMPLEX PO), Take  by mouth., Disp: , Rfl:   •  levothyroxine (SYNTHROID, LEVOTHROID) 25 MCG tablet, TAKE ONE TABLET BY  "MOUTH DAILY, Disp: 30 tablet, Rfl: 11  •  Multiple Vitamins-Minerals (MULTIVITAMIN ADULT PO), Take 1 tablet by mouth Daily., Disp: , Rfl:   MEDICATION LIST AND ALLERGIES REVIEWED.    Family History   Problem Relation Age of Onset   • Cancer Mother    • Cancer Father      Social History     Tobacco Use   • Smoking status: Never   • Smokeless tobacco: Never   Vaping Use   • Vaping Use: Never used   Substance Use Topics   • Alcohol use: Yes     Alcohol/week: 2.0 standard drinks     Types: 2 Cans of beer per week   • Drug use: No     Social History     Social History Narrative    Essentially non-smoker.  Smoked occasionally when in college but not regularly    Has had significant secondhand smoke exposure as a child    Went to school in an institution that had asbestos coated pipes but never worked with a specialist    No history of tuberculosis     FAMILY AND SOCIAL HISTORY REVIEWED.    Review of Systems  ALSO REFER TO SCANNED ROS SHEET FROM SAME DATE.    /70 (BP Location: Right arm, Patient Position: Sitting, Cuff Size: Adult)   Pulse 56   Temp 98.2 °F (36.8 °C) (Infrared)   Resp 16   Ht 177.8 cm (70\")   Wt 85.3 kg (188 lb)   SpO2 99%   BMI 26.98 kg/m²   Physical Exam  Vitals and nursing note reviewed.   Constitutional:       General: He is not in acute distress.     Appearance: He is well-developed. He is not diaphoretic.   HENT:      Head: Normocephalic and atraumatic.   Neck:      Thyroid: No thyromegaly.   Cardiovascular:      Rate and Rhythm: Normal rate and regular rhythm.      Heart sounds: Normal heart sounds. No murmur heard.  Pulmonary:      Effort: Pulmonary effort is normal.      Breath sounds: Normal breath sounds. No stridor.   Lymphadenopathy:      Cervical: No cervical adenopathy.      Upper Body:      Right upper body: No supraclavicular or epitrochlear adenopathy.      Left upper body: No supraclavicular or epitrochlear adenopathy.   Skin:     General: Skin is warm and dry. "   Neurological:      Mental Status: He is alert and oriented to person, place, and time.   Psychiatric:         Behavior: Behavior normal.         Results     Immunization History   Administered Date(s) Administered   • COVID-19 (PFIZER) PURPLE CAP 02/01/2021, 02/22/2021, 09/01/2021   • Covid-19 (Pfizer) Gray Cap 07/24/2022   • Fluzone Quad >6mos (Multi-dose) 11/01/2018   • Hepatitis A 11/01/2018     Problem List       ICD-10-CM ICD-9-CM   1. GAIL (obstructive sleep apnea) - possible  G47.33 327.23   2. Hemoptysis  R04.2 786.30   3. Incidental 10mm KAREN pulmonary nodule (Resolved on FU scan)  R91.1 793.11   4. Non-smoker  Z78.9 V49.89       Discussion     The etiology of his hemoptysis is unclear  Its been over a year since his last episode  Both of these episodes were associated with activity/running  He did not have evidence of pulmonary hypertension on previous cardiac evaluation  No other symptoms suggestive of a pulmonary hemorrhage syndrome    We discussed a differential diagnosis  At this point we do not even have chest imaging so that is going to be important    I have recommended chest imaging and at this point I would just suggest a routine noncontrast and high resolution CT scan of the chest  Looking for parenchymal abnormalities as well as underlying bronchiectasis.    We also will get labs today to look for pulmonary hemorrhage syndromes  We will try to get a urinalysis eventually, as well    Of asked him to call once his imaging is completed so we can download it from [facility]    We discussed doing a bronchoscopy but at this point he has not coughed up any blood for a week so would like to wait and defer a bronchoscopy until such time he coughs up blood again  Of asked him to call immediately if that occurs    I will plan to see him back after the labs and CAT scan    Moderate level of Medical Decision Making complexity based on 2 or more chronic stable illnesses and prescription drug management.    Carlos  Malik Hernandez MD  Note electronically signed    CC: Jose Manuel Harper MD

## 2022-11-15 NOTE — TELEPHONE ENCOUNTER
Caller: Branden Aguayo    Relationship: Self    Best call back number: [phone]    What is the best time to reach you: ANY    Who are you requesting to speak with (clinical staff, provider,  specific staff member): CLINICAL    What was the call regarding: PT DISCUSSED A SLEEP TEST WITH DR. TURNER BUT THERE WAS NEVER A REFERRAL FOR THE SLEEP LAB    Do you require a callback: YES

## 2022-11-16 DIAGNOSIS — R91.1 PULMONARY NODULE: Primary | ICD-10-CM

## 2022-11-16 LAB
ALBUMIN SERPL-MCNC: 4.4 G/DL (ref 3.5–5.2)
ALBUMIN/GLOB SERPL: 2 G/DL
ALP SERPL-CCNC: 76 U/L (ref 39–117)
ALT SERPL W P-5'-P-CCNC: 21 U/L (ref 1–41)
ANION GAP SERPL CALCULATED.3IONS-SCNC: 11.2 MMOL/L (ref 5–15)
AST SERPL-CCNC: 26 U/L (ref 1–40)
BASOPHILS # BLD AUTO: 0.03 10*3/MM3 (ref 0–0.2)
BASOPHILS NFR BLD AUTO: 0.7 % (ref 0–1.5)
BILIRUB SERPL-MCNC: 0.4 MG/DL (ref 0–1.2)
BUN SERPL-MCNC: 18 MG/DL (ref 6–20)
BUN/CREAT SERPL: 16.7 (ref 7–25)
CALCIUM SPEC-SCNC: 9.2 MG/DL (ref 8.6–10.5)
CHLORIDE SERPL-SCNC: 101 MMOL/L (ref 98–107)
CO2 SERPL-SCNC: 25.8 MMOL/L (ref 22–29)
CREAT SERPL-MCNC: 1.08 MG/DL (ref 0.76–1.27)
DEPRECATED RDW RBC AUTO: 44.5 FL (ref 37–54)
EGFRCR SERPLBLD CKD-EPI 2021: 82.1 ML/MIN/1.73
EOSINOPHIL # BLD AUTO: 0.14 10*3/MM3 (ref 0–0.4)
EOSINOPHIL NFR BLD AUTO: 3.3 % (ref 0.3–6.2)
ERYTHROCYTE [DISTWIDTH] IN BLOOD BY AUTOMATED COUNT: 13.2 % (ref 12.3–15.4)
GLOBULIN UR ELPH-MCNC: 2.2 GM/DL
GLUCOSE SERPL-MCNC: 87 MG/DL (ref 65–99)
HCT VFR BLD AUTO: 44.3 % (ref 37.5–51)
HGB BLD-MCNC: 14.7 G/DL (ref 13–17.7)
IMM GRANULOCYTES # BLD AUTO: 0.02 10*3/MM3 (ref 0–0.05)
IMM GRANULOCYTES NFR BLD AUTO: 0.5 % (ref 0–0.5)
LYMPHOCYTES # BLD AUTO: 1.74 10*3/MM3 (ref 0.7–3.1)
LYMPHOCYTES NFR BLD AUTO: 40.7 % (ref 19.6–45.3)
MCH RBC QN AUTO: 30.5 PG (ref 26.6–33)
MCHC RBC AUTO-ENTMCNC: 33.2 G/DL (ref 31.5–35.7)
MCV RBC AUTO: 91.9 FL (ref 79–97)
MONOCYTES # BLD AUTO: 0.62 10*3/MM3 (ref 0.1–0.9)
MONOCYTES NFR BLD AUTO: 14.5 % (ref 5–12)
NEUTROPHILS NFR BLD AUTO: 1.72 10*3/MM3 (ref 1.7–7)
NEUTROPHILS NFR BLD AUTO: 40.3 % (ref 42.7–76)
NRBC BLD AUTO-RTO: 0 /100 WBC (ref 0–0.2)
PLATELET # BLD AUTO: 210 10*3/MM3 (ref 140–450)
PMV BLD AUTO: 11.1 FL (ref 6–12)
POTASSIUM SERPL-SCNC: 4.4 MMOL/L (ref 3.5–5.2)
PROT SERPL-MCNC: 6.6 G/DL (ref 6–8.5)
PSA SERPL-MCNC: 0.59 NG/ML (ref 0–4)
RBC # BLD AUTO: 4.82 10*6/MM3 (ref 4.14–5.8)
SODIUM SERPL-SCNC: 138 MMOL/L (ref 136–145)
WBC NRBC COR # BLD: 4.27 10*3/MM3 (ref 3.4–10.8)

## 2022-11-17 LAB
C-ANCA TITR SER IF: NORMAL TITER
CENTROMERE B AB SER-ACNC: <0.2 AI (ref 0–0.9)
CHROMATIN AB SERPL-ACNC: <0.2 AI (ref 0–0.9)
DSDNA AB SER-ACNC: 2 IU/ML (ref 0–9)
ENA JO1 AB SER-ACNC: <0.2 AI (ref 0–0.9)
ENA RNP AB SER-ACNC: 0.4 AI (ref 0–0.9)
ENA SCL70 AB SER-ACNC: <0.2 AI (ref 0–0.9)
ENA SM AB SER-ACNC: <0.2 AI (ref 0–0.9)
ENA SS-A AB SER-ACNC: <0.2 AI (ref 0–0.9)
ENA SS-B AB SER-ACNC: <0.2 AI (ref 0–0.9)
GBM AB SER IA-ACNC: <0.2 UNITS (ref 0–0.9)
Lab: NORMAL
MYELOPEROXIDASE AB SER IA-ACNC: <0.2 UNITS (ref 0–0.9)
P-ANCA ATYPICAL TITR SER IF: NORMAL TITER
P-ANCA TITR SER IF: NORMAL TITER
PROTEINASE3 AB SER IA-ACNC: <0.2 UNITS (ref 0–0.9)

## 2022-11-28 DIAGNOSIS — Z00.6 EXAMINATION FOR NORMAL COMPARISON OR CONTROL IN CLINICAL RESEARCH: Primary | ICD-10-CM

## 2022-11-30 ENCOUNTER — DOCUMENTATION (OUTPATIENT)
Dept: PULMONOLOGY | Facility: CLINIC | Age: 52
End: 2022-11-30

## 2022-11-30 NOTE — PROGRESS NOTES
Patient underwent a CT scan of the chest at [facility]  I have reviewed this on PACS  Nothing really is helpful as an etiology for his hemoptysis  Does have a very small calcified nodule in his right lower lobe which I think is unlikely to be causing hemoptysis as it is so small and so peripheral    I reviewed his lab work and CT scan findings with him over the phone  He has not had any hemoptysis since when I last saw him  He has started back to running  Both the episodes of hemoptysis have been associated with running    This is probably hemoptysis and not another source as he describes a gurgling in his chest that precipitates coughing and then the production of blood or sputum with blood  If he continues to produce blood and we do not have an etiology at some point an ENT evaluation would probably be indicated  Also, a bronchoscopy    I have asked him to get a hold of me if he starts producing blood again and at some point will need to consider a bronchoscopy  Would like to do that as soon as possible after he produces some blood so we might be able to do it the next morning after a episode    I will just see him back if he has recurrent hemoptysis

## 2023-03-15 DIAGNOSIS — E78.5 HYPERLIPIDEMIA, UNSPECIFIED HYPERLIPIDEMIA TYPE: ICD-10-CM

## 2023-03-15 RX ORDER — ATORVASTATIN CALCIUM 80 MG/1
80 TABLET, FILM COATED ORAL
Qty: 30 TABLET | Refills: 5 | OUTPATIENT
Start: 2023-03-15

## 2023-03-15 NOTE — TELEPHONE ENCOUNTER
Caller: Branden Aguayo    Relationship: Self    Best call back number: [phone]    Requested Prescriptions:   Requested Prescriptions     Pending Prescriptions Disp Refills   • atorvastatin (LIPITOR) 80 MG tablet 30 tablet 5     Sig: Take 1 tablet by mouth every night at bedtime.        Pharmacy where request should be sent: [place], [place] - [email or web address] - [phone]  - [phone]      Additional details provided by patient: PATIENT IS OUT OF THE MEDICATION     Does the patient have less than a 3 day supply:  [x] Yes  [] No    Would you like a call back once the refill request has been completed: [x] Yes [] No    If the office needs to give you a call back, can they leave a voicemail: [x] Yes [] No    Prosper Thompson Rep   03/15/23 10:48 EDT

## 2023-10-10 ENCOUNTER — OFFICE VISIT (OUTPATIENT)
Dept: CARDIOLOGY | Facility: CLINIC | Age: 53
End: 2023-10-10
Payer: COMMERCIAL

## 2023-10-10 VITALS
HEIGHT: 70 IN | OXYGEN SATURATION: 98 % | BODY MASS INDEX: 27.06 KG/M2 | DIASTOLIC BLOOD PRESSURE: 72 MMHG | WEIGHT: 189 LBS | HEART RATE: 68 BPM | SYSTOLIC BLOOD PRESSURE: 114 MMHG

## 2023-10-10 DIAGNOSIS — R00.2 PALPITATIONS: Primary | ICD-10-CM

## 2023-10-10 DIAGNOSIS — G47.33 OSA (OBSTRUCTIVE SLEEP APNEA): ICD-10-CM

## 2023-10-10 DIAGNOSIS — R00.1 BRADYCARDIA, SINUS: ICD-10-CM

## 2023-10-10 PROCEDURE — 99214 OFFICE O/P EST MOD 30 MIN: CPT | Performed by: PHYSICIAN ASSISTANT

## 2023-10-10 RX ORDER — SILDENAFIL 100 MG/1
100 TABLET, FILM COATED ORAL AS NEEDED
COMMUNITY
Start: 2023-08-16 | End: 2023-10-10 | Stop reason: SDDI

## 2023-10-10 RX ORDER — OMEPRAZOLE 20 MG/1
20 TABLET, DELAYED RELEASE ORAL DAILY
COMMUNITY
Start: 2023-05-08 | End: 2023-10-10 | Stop reason: SDDI

## 2023-10-10 NOTE — PROGRESS NOTES
[facility]   OFFICE NOTE      Branden Aguayo  1970  PCP: Jose Manuel Harper MD    SUBJECTIVE:   Branden Aguayo is a 53 y.o. male seen for a follow up visit regarding the following:     CC: Vasovagal syncope    HPI:   52-year-old gentleman presents today for follow-up regarding vasovagal symptoms, palpitations dyslipidemia. Since we last seen he has had no further Vasovagal syncope. He is still is exercising on a regular basis.  He denies having any chest pain exertion.  He has no dizziness near syncope.  Denies any heart failure symptoms.  He states he has not had any significant palpitations.  He is having some muscle cramps in the hands he wonders is due to the statin.  He like to explore this further may consider taking Crestor in lieu of Lipitor.    Cardiac PMH: (Old records have been reviewed and summarized below)  Near syncope  Positive tronponin 0.59, 1.4.    Bradycardia, Stable  [facility] 11/15/18, Nonobstructive CAD, Normal EF.   Echocardiogram, Aortic valve calcification, Normal EF  Palpitations  ZioPatch 4/2020: occasional PACs 1%, short run of AT 4 beats, average HR 63 bpm. Less than 1% PVCs.   HLD:  Statin.   Asymptomatic Bradycardia  Hypothyroidism        Past Medical History, Past Surgical History, Family history, Social History, and Medications were all reviewed with the patient today and updated as necessary.       Current Outpatient Medications:     aspirin  MG tablet, Take 81 mg by mouth Daily., Disp: , Rfl:     atorvastatin (LIPITOR) 80 MG tablet, TAKE ONE TABLET BY MOUTH EVERY NIGHT AT BEDTIME, Disp: 30 tablet, Rfl: 5    DHA-EPA-Vitamin E (OMEGA-3 COMPLEX PO), Take  by mouth., Disp: , Rfl:     levothyroxine (SYNTHROID, LEVOTHROID) 25 MCG tablet, TAKE ONE TABLET BY MOUTH DAILY, Disp: 30 tablet, Rfl: 11    Multiple Vitamins-Minerals (MULTIVITAMIN ADULT PO), Take 1 tablet by mouth Daily., Disp: , Rfl:     omeprazole OTC (PriLOSEC OTC) 20 MG EC tablet,  "Take 1 tablet by mouth Daily., Disp: , Rfl:     sildenafil (VIAGRA) 100 MG tablet, Take 1 tablet by mouth As Needed., Disp: , Rfl:       Allergies   Allergen Reactions    Penicillins Rash     Patient Active Problem List   Diagnosis    NSTEMI (non-ST elevated myocardial infarction)    Palpitations    Near syncope    Bradycardia, sinus    Non-smoker    Hemoptysis    Incidental 10mm KAREN pulmonary nodule (Resolved on FU scan)    Hypothyroidism    GAIL (obstructive sleep apnea) - possible    Snoring    Excessive daytime sleepiness     Past Medical History:   Diagnosis Date    Bradycardia     Hyperlipidemia     Hypothyroidism 9/7/2022    NSTEMI (non-ST elevated myocardial infarction)     Palpitations      Past Surgical History:   Procedure Laterality Date    CARDIAC CATHETERIZATION N/A 11/15/2018    Procedure: Left Heart Cath;  Surgeon: Igor Castañeda MD;  Location: [facility] CATH INVASIVE LOCATION;  Service: Cardiology    COLONOSCOPY      NOSE SURGERY      17 y/o-injury    VASECTOMY  2011    WISDOM TOOTH EXTRACTION       Family History   Problem Relation Age of Onset    Cancer Mother     Cancer Father      Social History     Tobacco Use    Smoking status: Never    Smokeless tobacco: Never   Substance Use Topics    Alcohol use: Yes     Alcohol/week: 2.0 standard drinks of alcohol     Types: 2 Cans of beer per week       PHYSICAL EXAM:    /72 (BP Location: Left arm, Patient Position: Sitting)   Pulse 68   Ht 177.8 cm (70\")   Wt 85.7 kg (189 lb)   SpO2 98%   BMI 27.12 kg/mý        Wt Readings from Last 5 Encounters:   10/10/23 85.7 kg (189 lb)   11/15/22 85.3 kg (188 lb)   09/07/22 85.3 kg (188 lb)   08/29/22 85.2 kg (187 lb 12.8 oz)   12/14/21 89 kg (196 lb 4 oz)       BP Readings from Last 5 Encounters:   10/10/23 114/72   11/15/22 108/70   09/07/22 110/70   08/29/22 116/80   12/14/21 126/78       General appearance - Alert, well appearing, and in no distress   Mental status - Affect appropriate to mood.  Eyes - " Sclerae anicteric,  ENMT - Hearing grossly normal bilaterally, Dental hygiene good.  Neck - Carotids upstroke normal bilaterally, no bruits, no JVD.  Resp - Clear to auscultation, no wheezes, rales or rhonchi, symmetric air entry.  Heart - Normal rate, regular rhythm, normal S1, S2, 2/6 systolic ejection murmur no gallop or rub  GI - Soft, nontender, nondistended, no masses or organomegaly.  Neurological - Grossly intact - normal speech, no focal findings  Musculoskeletal - No joint tenderness, deformity or swelling, no muscular tenderness noted.  Extremities - Peripheral pulses normal, no pedal edema, no clubbing or cyanosis.  Skin - Normal coloration and turgor.  Psych -  oriented to person, place, and time.    Medical problems and test results were reviewed with the patient today.     No results found for this or any previous visit (from the past 672 hour(s)).        ASSESSMENT   1. Palpitations: Stable this is not bothersome to him currently.  2. VVS: no recurrent events. Marginal SBP.   3.  Asymptomatic bradycardia  4. Nonobstructive CAD by [facility] 11/15/18-On [place]a, Statin. No angina symptoms  5.  Echocardiogram clacification of aortic valve, No AS.Normal EF.  Repeat echocardiogram  6.  Dyslipidemia: Continue statin therapy  7.  Possible sleep apnea positive mild symptoms patient may pursue CPAP      PLAN  Echocardiogram regarding aortic calcification  Consider changing shoulder Crestor  Return for follow-up in 1 year sooner if any change in symptoms  Electronically signed by HERNANDEZ Rao, 10/10/23, 3:58 PM EDT.

## 2024-04-15 ENCOUNTER — OFFICE VISIT (OUTPATIENT)
Dept: PULMONOLOGY | Facility: CLINIC | Age: 54
End: 2024-04-15
Payer: COMMERCIAL

## 2024-04-15 VITALS
WEIGHT: 183 LBS | HEART RATE: 57 BPM | BODY MASS INDEX: 26.2 KG/M2 | HEIGHT: 70 IN | SYSTOLIC BLOOD PRESSURE: 126 MMHG | DIASTOLIC BLOOD PRESSURE: 90 MMHG | OXYGEN SATURATION: 98 % | RESPIRATION RATE: 16 BRPM | TEMPERATURE: 97.7 F

## 2024-04-15 DIAGNOSIS — R04.2 HEMOPTYSIS: Primary | ICD-10-CM

## 2024-04-15 DIAGNOSIS — G47.33 OSA (OBSTRUCTIVE SLEEP APNEA): ICD-10-CM

## 2024-04-15 PROCEDURE — 99214 OFFICE O/P EST MOD 30 MIN: CPT | Performed by: NURSE PRACTITIONER

## 2024-04-15 NOTE — PROGRESS NOTES
[facility]    CHIEF COMPLAINT    Recurrent hemoptysis    HISTORY OF PRESENT ILLNESS    Branden Aguayo is a 54 y.o.male here today for pulmonary follow-up regarding recurrent hemoptysis.     The patient was last seen in the office by Dr. Hernandez in November 2022.    In 2021 he developed an episode of hemoptysis while running.  Initial CT revealed a 10 mm left lung nodule that had resolved on subsequent CT imaging with no other findings of intrathoracic abnormalities or bronchiectasis.  Cardiac evaluation was unrevealing for pulmonary hypertension.  There were no other symptoms to suggest a pulmonary hemorrhagic syndrome and labs were unrevealing.    Since his last visit he has had episodes of hemoptysis 4 times with most recent episode 1 week ago.  This always happens when he is running and within the first mile.  On his most recent episode he did show me pictures of bright red sputum without any clots detected.    He did have a sleep study done outpatient that did show sleep apnea and is currently on PAP therapy.    Denies recent ED visits, hospitalizations, or exacerbations.     Denies fever, chills, night sweats, or hemoptysis. No recent sick contacts. No chest pain or palpitations. Denies lower extremity swelling or calf tenderness.     Patient Active Problem List   Diagnosis    NSTEMI (non-ST elevated myocardial infarction)    Palpitations    Near syncope    Bradycardia, sinus    Non-smoker    Hemoptysis    Incidental 10mm KAREN pulmonary nodule (Resolved on FU scan)    Hypothyroidism    GAIL    Snoring    Excessive daytime sleepiness       Allergies   Allergen Reactions    Penicillins Rash       Current Outpatient Medications:     aspirin  MG tablet, Take 81 mg by mouth Daily., Disp: , Rfl:     atorvastatin (LIPITOR) 80 MG tablet, TAKE ONE TABLET BY MOUTH EVERY NIGHT AT BEDTIME, Disp: 30 tablet, Rfl: 5    DHA-EPA-Vitamin E (OMEGA-3 COMPLEX PO), Take  by mouth., Disp: , Rfl:      "levothyroxine (SYNTHROID, LEVOTHROID) 25 MCG tablet, TAKE ONE TABLET BY MOUTH DAILY, Disp: 30 tablet, Rfl: 11    Multiple Vitamins-Minerals (MULTIVITAMIN ADULT PO), Take 1 tablet by mouth Daily., Disp: , Rfl:   MEDICATION LIST AND ALLERGIES REVIEWED.    Social History     Tobacco Use    Smoking status: Never     Passive exposure: Never    Smokeless tobacco: Never   Vaping Use    Vaping status: Never Used    Passive vaping exposure: Yes   Substance Use Topics    Alcohol use: Yes     Alcohol/week: 2.0 standard drinks of alcohol     Types: 2 Cans of beer per week    Drug use: No       FAMILY AND SOCIAL HISTORY REVIEWED.    Review of Systems   Constitutional:  Negative for chills, fatigue and fever.   Respiratory:  Positive for cough. Negative for chest tightness, shortness of breath and wheezing.    Cardiovascular:  Negative for chest pain, palpitations and leg swelling.   Skin:  Negative for color change.   Psychiatric/Behavioral:  Negative for sleep disturbance.    All other systems reviewed and are negative.  .    /90   Pulse 57   Temp 97.7 °F (36.5 °C)   Resp 16   Ht 177.8 cm (70\")   Wt 83 kg (183 lb)   SpO2 98% Comment: room air at rest  BMI 26.26 kg/m²     Immunization History   Administered Date(s) Administered    COVID-19 (PFIZER) BIVALENT 12+YRS 12/02/2022    COVID-19 (PFIZER) Purple Cap Monovalent 02/01/2021, 02/22/2021, 09/01/2021    COVID-19 F23 (PFIZER) 12YRS+ (COMIRNATY) 10/04/2023    Covid-19 (Pfizer) Gray Cap Monovalent 07/24/2022    Flublok 18+yrs 11/02/2020, 11/10/2021, 10/05/2022    Fluzone Quad >6mos (Multi-dose) 11/01/2018    Hepatitis A 11/01/2018    Shingrix 12/02/2022, 03/11/2023, 08/13/2023    Tdap 07/25/2011, 05/08/2023       Physical Exam  Vitals reviewed.   Constitutional:       General: He is not in acute distress.     Appearance: Normal appearance.   Cardiovascular:      Rate and Rhythm: Normal rate and regular rhythm.      Pulses: Normal pulses.      Heart sounds: Normal " "heart sounds.   Pulmonary:      Effort: Pulmonary effort is normal. No respiratory distress.      Breath sounds: No wheezing, rhonchi or rales.   Skin:     General: Skin is warm and dry.   Neurological:      Mental Status: He is alert.         RESULTS    PFTs: None     CXR PA/lateral:   Awaiting final MD interpretation. I will contact the patient if abnormal results.     PROBLEM LIST    Problem List Items Addressed This Visit       Hemoptysis - Primary    Relevant Orders    XR Chest PA & Lateral    GAIL       DISCUSSION    Mr. Gaviria was seen today for pulmonary regarding recurrent episodes of hemoptysis during exercise.  His most recent episode was 1 week ago.  Each episode has occurred while he was running within the first mile, however does not consistently occur when he runs.  It has also occurred during 3 seasons.    Prior CT imaging was unrevealing for hemorrhagic focus or lesion.  Prior lab testing was also unrevealing.    Based on his description this does seem to be a pulmonary source as it is usually preceded by \"gurgling \"in his chest followed by a coughing episode and naomi hemoptysis.    It would be very beneficial to pursue bronchoscopy within 24-48 hours of his next episode, however this is very difficult to predict.  I have encouraged him to reach out to me ASAP after his next episode and I will attempt to arrange a bronchoscopy by one of our pulmonologist.    If his hemoptysis is severe I did encourage him to go to the ER which will most likely necessitate a bronchoscopy as well, however this is not the most ideal scenario for him.  Also discussed the possibility of him being evaluated by interventional radiology as well, but the for step would be to pursue bronchoscopy.    We could pursue ENT evaluation, however I do not feel a laryngoscopy would be of much benefit based on his symptoms.    Prior HRCT overall was unrevealing, but did show some bronchiectatic segments.  He does have a history of reflux " and is no longer on medications and does not abide by reflux precautions.  We did discuss reflux precautions at today's visit and he will implement these interventions.    Return to clinic in 6 months or sooner as needed.  The patient will contact me if he has any recurrent hemoptysis and attempts to arrange a bronchoscopy.    Continue PAP therapy for his GAIL.    I personally spent a total of 33 minutes on patient visit today including chart review, face to face with the patient obtaining the history and physical exam, review of pertinent images and tests, counseling and discussion and/or coordination of care as described above, and documentation.  Total time excludes time spent on other separate services such as performing procedures or test interpretation, if applicable.    Electronically signed by MILAN Sauceda, 04/15/24, 11:00 AM EDT.    Please note that portions of this note were completed with a voice recognition program.        CC: Jose Manuel Harper MD

## 2025-07-21 ENCOUNTER — OFFICE VISIT (OUTPATIENT)
Dept: PULMONOLOGY | Facility: CLINIC | Age: 55
End: 2025-07-21
Payer: COMMERCIAL

## 2025-07-21 VITALS
DIASTOLIC BLOOD PRESSURE: 66 MMHG | HEIGHT: 70 IN | BODY MASS INDEX: 26.34 KG/M2 | TEMPERATURE: 97.3 F | WEIGHT: 184 LBS | SYSTOLIC BLOOD PRESSURE: 126 MMHG | HEART RATE: 60 BPM | OXYGEN SATURATION: 99 %

## 2025-07-21 DIAGNOSIS — R04.2 HEMOPTYSIS: ICD-10-CM

## 2025-07-21 DIAGNOSIS — G47.33 OSA (OBSTRUCTIVE SLEEP APNEA): ICD-10-CM

## 2025-07-21 DIAGNOSIS — R06.02 SHORTNESS OF BREATH: Primary | ICD-10-CM

## 2025-07-21 PROCEDURE — 99214 OFFICE O/P EST MOD 30 MIN: CPT | Performed by: NURSE PRACTITIONER

## 2025-07-21 PROCEDURE — 94375 RESPIRATORY FLOW VOLUME LOOP: CPT | Performed by: NURSE PRACTITIONER

## 2025-07-21 PROCEDURE — 94726 PLETHYSMOGRAPHY LUNG VOLUMES: CPT | Performed by: NURSE PRACTITIONER

## 2025-07-21 PROCEDURE — 94729 DIFFUSING CAPACITY: CPT | Performed by: NURSE PRACTITIONER

## 2025-07-21 RX ORDER — LEVOTHYROXINE SODIUM 50 UG/1
50 TABLET ORAL DAILY
COMMUNITY
Start: 2025-02-10

## 2025-07-21 RX ORDER — ROSUVASTATIN CALCIUM 40 MG/1
40 TABLET, COATED ORAL DAILY
COMMUNITY
Start: 2025-03-25

## 2025-07-21 RX ORDER — EZETIMIBE 10 MG/1
TABLET ORAL
COMMUNITY
Start: 2025-03-24

## 2025-07-21 RX ORDER — ACETAMINOPHEN AND CODEINE PHOSPHATE 300; 30 MG/1; MG/1
TABLET ORAL
COMMUNITY
Start: 2025-06-10

## 2025-07-21 RX ORDER — CLINDAMYCIN HYDROCHLORIDE 300 MG/1
1 CAPSULE ORAL 3 TIMES DAILY
COMMUNITY
Start: 2025-05-19

## 2025-07-21 RX ORDER — AZITHROMYCIN 250 MG/1
TABLET, FILM COATED ORAL
COMMUNITY
Start: 2025-06-10

## 2025-07-21 NOTE — PROGRESS NOTES
[facility]    CHIEF COMPLAINT    Recurrent hemoptysis    HISTORY OF PRESENT ILLNESS    HPI:   Branden Aguayo is a 55 y.o.male here today for pulmonary follow-up regarding recurrent hemoptysis.     The patient was last seen in the office by Dr. Hernandez in November 2022.    In 2021 he developed an episode of hemoptysis while running.  Initial CT revealed a 10 mm left lung nodule that had resolved on subsequent CT imaging with no other findings of intrathoracic abnormalities or bronchiectasis.  Cardiac evaluation was unrevealing for pulmonary hypertension.  There were no other symptoms to suggest a pulmonary hemorrhagic syndrome and labs were unrevealing.    He did have a sleep study done outpatient that did show sleep apnea and is currently on PAP therapy.    Interval history:   Patient was last seen in the office by me in April 2024.  At that time he had had 4 other issues of hemoptysis most recently within a week of his evaluation and each time this only occurred when running within the first mile.    Has had no further issues with hemoptysis since his last visit.     He did have his home tested for radon and levels were significantly high.  He has lived in his home for at least 5 years.    Does have shortness of breath on occasion particularly when going up an incline.  He is an avid runner and cycles frequently and does not seem to have any issues with this.    Recently diagnosed with moderate GAIL but is yet to start CPAP therapy.    Denies recent ED visits, hospitalizations, or exacerbations.     Denies fever, chills, night sweats, or hemoptysis. No recent sick contacts. No chest pain or palpitations. Denies lower extremity swelling or calf tenderness.     Patient Active Problem List   Diagnosis    NSTEMI (non-ST elevated myocardial infarction)    Palpitations    Near syncope    Bradycardia, sinus    Non-smoker    Hemoptysis    Incidental 10mm KAREN pulmonary nodule (Resolved on FU scan)     Hypothyroidism    GAIL    Snoring    Excessive daytime sleepiness       Allergies   Allergen Reactions    Penicillins Rash       Current Outpatient Medications:     acetaminophen-codeine (TYLENOL with CODEINE #3) 300-30 MG per tablet, TAKE 1 TABLET EVERY 6-8 HRS AS NEEDED AS NEEDED., Disp: , Rfl:     aspirin  MG tablet, Take 81 mg by mouth Daily., Disp: , Rfl:     clindamycin (CLEOCIN) 300 MG capsule, Take 1 capsule by mouth 3 times a day., Disp: , Rfl:     DHA-EPA-Vitamin E (OMEGA-3 COMPLEX PO), Take  by mouth., Disp: , Rfl:     ezetimibe (ZETIA) 10 MG tablet, , Disp: , Rfl:     levothyroxine (SYNTHROID, LEVOTHROID) 50 MCG tablet, Take 1 tablet by mouth Daily., Disp: , Rfl:     Multiple Vitamins-Minerals (MULTIVITAMIN ADULT PO), Take 1 tablet by mouth Daily., Disp: , Rfl:     rosuvastatin (CRESTOR) 40 MG tablet, Take 1 tablet by mouth Daily., Disp: , Rfl:     atorvastatin (LIPITOR) 80 MG tablet, TAKE ONE TABLET BY MOUTH EVERY NIGHT AT BEDTIME (Patient not taking: Reported on 7/21/2025), Disp: 30 tablet, Rfl: 5    azithromycin (ZITHROMAX) 250 MG tablet, TAKE 2 TABLETS BY MOUTH FOR 1 DAY THEN TAKE 1 TABLET BY MOUTH EVERY DAY FOR 4 DAYS (Patient not taking: Reported on 7/21/2025), Disp: , Rfl:     levothyroxine (SYNTHROID, LEVOTHROID) 25 MCG tablet, TAKE ONE TABLET BY MOUTH DAILY, Disp: 30 tablet, Rfl: 11  MEDICATION LIST AND ALLERGIES REVIEWED.    Social History     Tobacco Use    Smoking status: Never     Passive exposure: Never    Smokeless tobacco: Never   Vaping Use    Vaping status: Never Used    Passive vaping exposure: Yes   Substance Use Topics    Alcohol use: Yes     Alcohol/week: 2.0 standard drinks of alcohol     Types: 2 Cans of beer per week    Drug use: No       FAMILY AND SOCIAL HISTORY REVIEWED.    Review of Systems   Constitutional:  Negative for chills, fatigue and fever.   Respiratory:  Positive for shortness of breath. Negative for cough, chest tightness and wheezing.    Cardiovascular:   "Negative for chest pain, palpitations and leg swelling.   Skin:  Negative for color change.   Psychiatric/Behavioral:  Negative for sleep disturbance.    All other systems reviewed and are negative.  .    /66 (BP Location: Right arm, Patient Position: Sitting, Cuff Size: Adult)   Pulse 60   Temp 97.3 °F (36.3 °C)   Ht 177.8 cm (70\")   Wt 83.5 kg (184 lb)   SpO2 99% Comment: room air at rest  BMI 26.40 kg/m²     Immunization History   Administered Date(s) Administered    COVID-19 (PFIZER) 12YRS+ (COMIRNATY) 10/04/2023, 10/07/2024    COVID-19 (PFIZER) BIVALENT 12+YRS 12/02/2022    COVID-19 (PFIZER) Purple Cap Monovalent 02/01/2021, 02/22/2021, 09/01/2021    Covid-19 (Pfizer) Gray Cap Monovalent 07/24/2022    Flublok 18+yrs 11/02/2020, 11/10/2021, 10/05/2022    Fluzone  >6mos 10/07/2024    Fluzone (or Fluarix & Flulaval for VFC) >6mos 10/04/2023    Fluzone Quad >6mos (Multi-dose) 11/01/2018    Hepatitis A 11/01/2018    Shingrix 12/02/2022, 03/11/2023, 08/13/2023    Tdap 07/25/2011, 05/08/2023       Physical Exam  Vitals reviewed.   Constitutional:       General: He is not in acute distress.     Appearance: Normal appearance.   Cardiovascular:      Rate and Rhythm: Normal rate and regular rhythm.      Pulses: Normal pulses.      Heart sounds: Normal heart sounds.   Pulmonary:      Effort: Pulmonary effort is normal. No respiratory distress.      Breath sounds: No wheezing, rhonchi or rales.   Skin:     General: Skin is warm and dry.   Neurological:      General: No focal deficit present.      Mental Status: He is alert and oriented to person, place, and time.         RESULTS    PFTs:   7/21/2025: No airway obstruction.  Mild restriction with TLC 6.0, 79% predicted.  No air trapping.  Normal DLCO.    CXR PA/lateral:   Awaiting final MD interpretation. I will contact the patient if abnormal results.     PROBLEM LIST    Problem List Items Addressed This Visit       Hemoptysis    GAIL     Other Visit Diagnoses  "        Shortness of breath    -  Primary    Relevant Orders    XR Chest PA & Lateral    Breathing Capacity Test (Completed)    CT Chest Without Contrast            DISCUSSION    Mr. Aguayo was seen today for pulmonary follow-up.    No further incidence of hemoptysis since his last visit.    He is an avid runner and cyclist who does have shortness of breath when going up an incline.    Recently found out that he had pretty significant levels of radon in his home.    Also recently diagnosed with moderate GAIL but is yet to implement PAP therapy.    Hemoptysis  Each incidence has occurred within 10 minutes of aerobic exercise but did not consistently occur when he runs/cycles.  In the future should this recur, I did inform him that he needs to contact our office so we can arrange a bronchoscopy for airway evaluation as these episodes seem to be very brief and self-limiting.  Should his hemoptysis become severe, I did encourage him to go to the ED and he verbalizes understanding.  Largely denies any symptoms of epistaxis or GI source that could be contributing.  There is a history of gastric reflux and I do recommend ongoing reflux precautions, however he is no longer on medication.  Prior HRCT was unrevealing but does have some bronchiectatic segments.    Shortness of breath  Restrictive pattern on PFT  Most pronounced when going up an incline. This is new from his last pulmonary evaluation.  He also has some mild restriction of uncertain significance.  I doubt this is due to physical deconditioning as he is very active with normal BMI.  Will proceed with CT of the chest to rule out any pulmonary process such as interstitial lung disease which could be contributing.   Does not have any symptoms of asthma at current, however down the road should his symptoms progress would consider trialing him on an ICS inhaler.  Has had some mild peripheral eosinophilia in the past as well.  Echocardiogram from 2023 was unrevealing and  showed a normal RVSP.  Could also consider repeating the study for evaluation of any underlying pulmonary hypertension.  I do continue to recommend 30 minutes of low impact exercise most days of the week.    Discussed side effects of chronic radon exposure and already pursuing a CT of the chest regarding his shortness of breath.  May need to consider repeat study down the road.  At current he does not have any concerning symptoms of an active lung malignancy.  He did get a radon fan placed with significant reductions of these levels.    GAIL  Diagnosed as moderate by another provider and has yet to implement CPAP therapy as he travels frequently for his job and it has been difficult for him to obtain the machine.   I do recommend implementation of therapy as we discussed that GAIL is not a benign disease process and may contribute to worsening cardiac and metabolic issues down the road.  He declines oral mandibular device or inspire device implantation.    Return to clinic in 1 year with PFTs and CXR may see me sooner if needed.    I personally spent a total of 33 minutes on patient visit today including chart review, face to face with the patient obtaining the history and physical exam, review of pertinent images and tests, counseling and discussion and/or coordination of care as described above, and documentation.  Total time excludes time spent on other separate services such as performing procedures or test interpretation, if applicable.    Electronically signed by MILAN Sauceda, 07/21/25, 2:45 PM EDT.    Please note that portions of this note were completed with a voice recognition program.        CC: Jose Manuel Harper MD

## (undated) DEVICE — PK CATH CARD 10

## (undated) DEVICE — DEV COMP RAD PRELUDESYNC 24CM

## (undated) DEVICE — CATH DIAG EXPO .045 FL3.5 5F 100CM

## (undated) DEVICE — MODEL BT2000 P/N 700287-012KIT CONTENTS: MANIFOLD WITH SALINE AND CONTRAST PORTS, SALINE TUBING WITH SPIKE AND HAND SYRINGE, TRANSDUCER: Brand: BT2000 AUTOMATED MANIFOLD KIT

## (undated) DEVICE — GW FC FLOP/TP .035 260CM 3MM

## (undated) DEVICE — INTRO SHEATH PRELUDE IDEAL SPRNG COIL 021 6F 23X80CM

## (undated) DEVICE — CATH DIAG EXPO M/ PK 5F FL4/FR4 PIG

## (undated) DEVICE — MODEL AT P65, P/N 701554-001KIT CONTENTS: HAND CONTROLLER, 3-WAY HIGH-PRESSURE STOPCOCK WITH ROTATING END AND PREMIUM HIGH-PRESSURE TUBING: Brand: ANGIOTOUCH® KIT

## (undated) DEVICE — LN SMPL 02 SMRT/CAPNOLINE PLS A/INTERMD